# Patient Record
Sex: FEMALE | Race: BLACK OR AFRICAN AMERICAN | NOT HISPANIC OR LATINO | Employment: FULL TIME | URBAN - METROPOLITAN AREA
[De-identification: names, ages, dates, MRNs, and addresses within clinical notes are randomized per-mention and may not be internally consistent; named-entity substitution may affect disease eponyms.]

---

## 2018-02-28 ENCOUNTER — TELEPHONE (OUTPATIENT)
Dept: TRANSPLANT | Facility: CLINIC | Age: 53
End: 2018-02-28

## 2018-02-28 NOTE — TELEPHONE ENCOUNTER
Bridgett Chua called and stated that she is interested in becoming a living donor for her brother in law, Ora Leavitt.  Medical and social history obtained.  No contraindications noted.  All questions answered.  HLA kit requested.  
No

## 2018-03-22 ENCOUNTER — TELEPHONE (OUTPATIENT)
Dept: TRANSPLANT | Facility: CLINIC | Age: 53
End: 2018-03-22

## 2018-03-22 DIAGNOSIS — Z00.5 TRANSPLANT DONOR EVALUATION: Primary | ICD-10-CM

## 2018-03-27 ENCOUNTER — LAB VISIT (OUTPATIENT)
Dept: LAB | Facility: HOSPITAL | Age: 53
End: 2018-03-27
Payer: COMMERCIAL

## 2018-03-27 DIAGNOSIS — Z00.5 TRANSPLANT DONOR EVALUATION: ICD-10-CM

## 2018-03-27 LAB
ABO GROUP BLD: NORMAL
RH BLD: NORMAL

## 2018-03-27 PROCEDURE — 81373 HLA I TYPING 1 LOCUS LR: CPT | Mod: 91,PO,TXP

## 2018-03-27 PROCEDURE — 81373 HLA I TYPING 1 LOCUS LR: CPT | Mod: PO,TXP

## 2018-03-27 PROCEDURE — 36415 COLL VENOUS BLD VENIPUNCTURE: CPT | Mod: TXP

## 2018-03-27 PROCEDURE — 81376 HLA II TYPING 1 LOCUS LR: CPT | Mod: PO,TXP

## 2018-03-27 PROCEDURE — 86901 BLOOD TYPING SEROLOGIC RH(D): CPT | Mod: TXP

## 2018-03-27 PROCEDURE — 81376 HLA II TYPING 1 LOCUS LR: CPT | Mod: 91,PO,TXP

## 2018-03-31 LAB — HLATY INTERPRETATION: NORMAL

## 2018-04-10 LAB
HLA DRB4 1: NORMAL
HLA SSO DNA TYPING CLASS I & II INTERPRETATION: NORMAL
HLA-A 1 SERO. EQUIV: 2
HLA-A 1: NORMAL
HLA-A 2 SERO. EQUIV: 66
HLA-A 2: NORMAL
HLA-B 1 SERO. EQUIV: 62
HLA-B 1: NORMAL
HLA-B 2 SERO. EQUIV: 71
HLA-B 2: NORMAL
HLA-BW 1 SERO. EQUIV: 6
HLA-BW 2 SERO. EQUIV: NORMAL
HLA-C 1: NORMAL
HLA-C 2: NORMAL
HLA-CW 1 SERO. EQUIV: 10
HLA-CW 2 SERO. EQUIV: 9
HLA-DQ 1 SERO. EQUIV: 7
HLA-DQ 2 SERO. EQUIV: 6
HLA-DQB1 1: NORMAL
HLA-DQB1 2: NORMAL
HLA-DRB1 1 SERO. EQUIV: 11
HLA-DRB1 1: NORMAL
HLA-DRB1 2 SERO. EQUIV: 15
HLA-DRB1 2: NORMAL
HLA-DRB3 1: NORMAL
HLA-DRB3 2: NORMAL
HLA-DRB345 1 SERO. EQUIV: 52
HLA-DRB345 2 SERO. EQUIV: 51
HLA-DRB4 2: NORMAL
HLA-DRB5 1: NORMAL
HLA-DRB5 2: NORMAL
SSDQB TESTING DATE: NORMAL
SSDRB TESTING DATE: NORMAL
SSOA TESTING DATE: NORMAL
SSOB TESTING DATE: NORMAL
SSOC TESTING DATE: NORMAL
SSODR TESTING DATE: NORMAL
TYSSO TESTING DATE: NORMAL

## 2018-04-24 ENCOUNTER — TELEPHONE (OUTPATIENT)
Dept: TRANSPLANT | Facility: CLINIC | Age: 53
End: 2018-04-24

## 2018-04-24 DIAGNOSIS — Z00.5 TRANSPLANT DONOR EVALUATION: Primary | ICD-10-CM

## 2018-04-24 NOTE — TELEPHONE ENCOUNTER
Patient notified that the results of the crossmatch with KETTY Leavitt show that they are compatible. Patient states she would like to proceed with testing. Required testing discussed and information provided to schedule appointments.   Patient will have results of mammogram and colonoscopy faxed to us. Will schedule routine gyn exam.   Education material emailed to patient for review.   All questions were answered and patient verbalized understanding.

## 2018-07-16 ENCOUNTER — HOSPITAL ENCOUNTER (OUTPATIENT)
Dept: RADIOLOGY | Facility: HOSPITAL | Age: 53
Discharge: HOME | End: 2018-07-16
Attending: NURSE PRACTITIONER
Payer: COMMERCIAL

## 2018-07-16 ENCOUNTER — HOSPITAL ENCOUNTER (OUTPATIENT)
Dept: CARDIOLOGY | Facility: CLINIC | Age: 53
Discharge: HOME | End: 2018-07-16
Attending: NURSE PRACTITIONER
Payer: COMMERCIAL

## 2018-07-16 ENCOUNTER — LAB VISIT (OUTPATIENT)
Dept: LAB | Facility: HOSPITAL | Age: 53
End: 2018-07-16
Payer: COMMERCIAL

## 2018-07-16 DIAGNOSIS — Z00.5 TRANSPLANT DONOR EVALUATION: ICD-10-CM

## 2018-07-16 LAB
CREAT CL/1.73 SQ M 12H UR+SERPL-ARVRAT: 88 ML/MIN
CREAT SERPL-MCNC: 1 MG/DL
CREAT UR-MCNC: 71 MG/DL
CREATININE, URINE (MG/SPEC): 1260.3 MG/SPEC
DIASTOLIC DYSFUNCTION: NO
PROT 24H UR-MRATE: NORMAL MG/SPEC
PROT UR-MCNC: <7 MG/DL
RETIRED EF AND QEF - SEE NOTES: 65 (ref 55–65)
URINE COLLECTION DURATION: 24 HR
URINE COLLECTION DURATION: 24 HR
URINE VOLUME: 1775 ML
URINE VOLUME: 1775 ML

## 2018-07-16 PROCEDURE — 93351 STRESS TTE COMPLETE: CPT | Mod: S$GLB,TXP,, | Performed by: INTERNAL MEDICINE

## 2018-07-16 PROCEDURE — 84156 ASSAY OF PROTEIN URINE: CPT | Mod: TXP

## 2018-07-16 PROCEDURE — 78707 K FLOW/FUNCT IMAGE W/O DRUG: CPT | Mod: 26,TXP,, | Performed by: RADIOLOGY

## 2018-07-16 PROCEDURE — 82575 CREATININE CLEARANCE TEST: CPT | Mod: TXP

## 2018-07-16 PROCEDURE — A9562 TC99M MERTIATIDE: HCPCS | Mod: TXP

## 2018-07-16 PROCEDURE — 93321 DOPPLER ECHO F-UP/LMTD STD: CPT | Mod: S$GLB,TXP,, | Performed by: INTERNAL MEDICINE

## 2018-07-16 PROCEDURE — 71046 X-RAY EXAM CHEST 2 VIEWS: CPT | Mod: 26,TXP,, | Performed by: RADIOLOGY

## 2018-07-16 PROCEDURE — 71046 X-RAY EXAM CHEST 2 VIEWS: CPT | Mod: TC,FY,TXP

## 2018-07-17 ENCOUNTER — OFFICE VISIT (OUTPATIENT)
Dept: TRANSPLANT | Facility: CLINIC | Age: 53
End: 2018-07-17
Payer: COMMERCIAL

## 2018-07-17 ENCOUNTER — HOSPITAL ENCOUNTER (OUTPATIENT)
Dept: RADIOLOGY | Facility: HOSPITAL | Age: 53
Discharge: HOME | End: 2018-07-17
Attending: TRANSPLANT SURGERY
Payer: COMMERCIAL

## 2018-07-17 VITALS
OXYGEN SATURATION: 98 % | HEIGHT: 61 IN | WEIGHT: 166 LBS | DIASTOLIC BLOOD PRESSURE: 86 MMHG | BODY MASS INDEX: 31.34 KG/M2 | RESPIRATION RATE: 18 BRPM | HEART RATE: 87 BPM | SYSTOLIC BLOOD PRESSURE: 154 MMHG | TEMPERATURE: 99 F

## 2018-07-17 DIAGNOSIS — Z00.5 WILLING TO BE KIDNEY DONOR: Primary | ICD-10-CM

## 2018-07-17 DIAGNOSIS — E78.5 HYPERLIPIDEMIA, UNSPECIFIED HYPERLIPIDEMIA TYPE: Chronic | ICD-10-CM

## 2018-07-17 DIAGNOSIS — F90.9 ATTENTION DEFICIT HYPERACTIVITY DISORDER (ADHD), UNSPECIFIED ADHD TYPE: ICD-10-CM

## 2018-07-17 DIAGNOSIS — Z00.5 TRANSPLANT DONOR EVALUATION: ICD-10-CM

## 2018-07-17 PROCEDURE — 99999 PR PBB SHADOW E&M-EST. PATIENT-LVL IV: CPT | Mod: PBBFAC,TXP,, | Performed by: INTERNAL MEDICINE

## 2018-07-17 PROCEDURE — 97802 MEDICAL NUTRITION INDIV IN: CPT | Mod: S$GLB,TXP,, | Performed by: DIETITIAN, REGISTERED

## 2018-07-17 PROCEDURE — 3008F BODY MASS INDEX DOCD: CPT | Mod: CPTII,S$GLB,TXP, | Performed by: INTERNAL MEDICINE

## 2018-07-17 PROCEDURE — 25500020 PHARM REV CODE 255: Mod: TXP | Performed by: TRANSPLANT SURGERY

## 2018-07-17 PROCEDURE — 74174 CTA ABD&PLVS W/CONTRAST: CPT | Mod: 26,TXP,, | Performed by: RADIOLOGY

## 2018-07-17 PROCEDURE — 74174 CTA ABD&PLVS W/CONTRAST: CPT | Mod: TC,TXP

## 2018-07-17 PROCEDURE — 99205 OFFICE O/P NEW HI 60 MIN: CPT | Mod: S$GLB,TXP,, | Performed by: INTERNAL MEDICINE

## 2018-07-17 PROCEDURE — 99204 OFFICE O/P NEW MOD 45 MIN: CPT | Mod: S$GLB,TXP,, | Performed by: TRANSPLANT SURGERY

## 2018-07-17 RX ORDER — SIMVASTATIN 40 MG/1
40 TABLET, FILM COATED ORAL NIGHTLY
COMMUNITY

## 2018-07-17 RX ORDER — DEXTROAMPHETAMINE SACCHARATE, AMPHETAMINE ASPARTATE, DEXTROAMPHETAMINE SULFATE AND AMPHETAMINE SULFATE 5; 5; 5; 5 MG/1; MG/1; MG/1; MG/1
1 TABLET ORAL DAILY
COMMUNITY

## 2018-07-17 RX ADMIN — IOHEXOL 125 ML: 350 INJECTION, SOLUTION INTRAVENOUS at 01:07

## 2018-07-17 NOTE — LETTER
July 17, 2018                     [facility]  [address]  [facility] [address]  Phone: [phone]   Patient: Bridgett Chua   MR Number: [id number]   YOB: 1965   Date of Visit: 7/17/2018       Dear      Thank you for referring Bridgett Chua to me for evaluation. Attached you will find relevant portions of my assessment and plan of care.    If you have questions, please do not hesitate to call me. I look forward to following Bridgett Chua along with you.    Sincerely,    Vivian Olvera MD    Enclosure    If you would like to receive this communication electronically, please contact [email or web address] or [phone] to request [employer] access.    [employer] is a tool which provides read-only access to select patient information with whom you have a relationship. Its easy to use and provides real time access to review your patients record including encounter summaries, notes, results, and demographic information.    If you feel you have received this communication in error or would no longer like to receive these types of communications, please e-mail [email or web address]

## 2018-07-17 NOTE — PROGRESS NOTES
Kidney Transplant Donor Evaluation    Subjective:       CC:  Initial evaluation of kidney donor candidacy.    HPI:  Ms. Chua is a 52 y.o. year old Unknown female who has presented to be evaluated as a potential living unrelated donor for her brother-in-law.  Ms. Chua reports being here without coercion, payment, guilt or other alternative motives other than wanting to help someone with kidney disease.    States she uses adderall to keep her focused while she works at night - states her PCP prescribed it ~5 months ago.     HLD - started zocor <1 month ago.     Patient denies any history of coronary artery disease, stroke, seizure disorder, chronic obstructive pulmonary disease, liver disease, kidney stones, gallstones, deep venous thrombosis, pulmonary embolism, recurrent urinary tract infections or malignancies.    She is accompanied to visit by her cousin from her dad's side.     Past Medical History:   Past Medical History:   Diagnosis Date    ADD (attention deficit disorder)     Hyperlipidemia     Willing to be kidney donor        Past Surgical History:   Past Surgical History:   Procedure Laterality Date    DILATION AND CURETTAGE OF UTERUS  2007       Medications:   Current Outpatient Prescriptions   Medication Sig Dispense Refill    dextroamphetamine-amphetamine (ADDERALL) 20 mg tablet Take 1 tablet by mouth once daily.      simvastatin (ZOCOR) 40 MG tablet Take 40 mg by mouth every evening.       No current facility-administered medications for this visit.        Allergies:   Review of patient's allergies indicates:  No Known Allergies    Social History:  Social History     Social History    Marital status: Single     Spouse name: N/A    Number of children: 0    Years of education: N/A     Occupational History    Not on file.     Social History Main Topics    Smoking status: Never Smoker    Smokeless tobacco: Never Used    Alcohol use No      Comment: rarely will drink a glass of wine     Drug use: No    Sexual activity: Not on file     Other Topics Concern    Not on file     Social History Narrative    She works as a  for [employer]    Lives with sister and brother-in-law (the potential kidney recipient)    No pets       Family History:   Family History   Problem Relation Age of Onset    Diabetes Mother         diagnosed in her 50-60s    Hypertension Mother         diagnosed in her 50-60s    Breast cancer Mother         diagnosed in her 50s    COPD Mother     Diabetes Father         diagnosed in his 60s    Hypertension Father         diagnosed in his 60s    Prostate cancer Father         diagnosed in his late 40s    Heart attack Father         MI in his mid-late 60s    Lupus Sister         diagnosed in her early 40s    No Known Problems Brother     Breast cancer Maternal Grandmother     No Known Problems Sister     No Known Problems Brother     No Known Problems Brother     Cervical cancer Paternal Grandmother         diagnosed in her 80s    Colon cancer Maternal Aunt     Prostate cancer Paternal Uncle     Heart disease Paternal Uncle     Prostate cancer Paternal Uncle     Prostate cancer Paternal Uncle     Kidney disease Neg Hx     Stroke Neg Hx      OB History      Para Term  AB Living    1 0            SAB TAB Ectopic Multiple Live Births                     Obstetric Comments    Miscarriage at ~3 months requiring D&C          Review of Systems  Constitutional: no fevers, chills, fatigue, loss of appetite, weight gain or loss, night sweats  Eyes: No dryness, redness, blurry vision, loss of vision  Ear, Nose, Throat: No hearing problems, runny nose, mouth dryness, problems swallowing, dental problems  Respiratory: No cough, shortness of breath, sputum, bloody sputum  Cardiovascular: No chest pain or palpitations  Gastrointestinal: no nausea, vomiting, diarrhea, constipation, abdominal pain, blood in stool  Genitourinary: +menstrual cycles occurring once  "every 3 months now - last one was ~1 month ago; No urinary frequency, urgency, incontinence, burning, pain, or bleeding; no flank pain  Skin: no rash or itching  Musculoskeletal: no arthritis or muscle pain  Neurologic: no motor weakness, numbness, tingling, or seizures  Psychiatric: no depression, mood swings, kavya, or anxiety  Hematologic: no easy bleeding or bruising, anemia, or blood clots  Endocrine: no thyroid problems, hot or cold intolerance, or diabetes  Immunologic: no chronic infection, hay fever, eczema    Functional History  Bridgett Chua is able to climb a flight of stairs, walk a mile, jog, and play recreational sports without any limitation or difficulty.    Health Care Maintenance  Routine follow up with PCP: Britney Jo - NP    For Women:  Last Physical Exam: June 2018  Last Colonoscopy: June 2018  Last Pap smear: April/May 2018  Last Mammogram: April 2018  Last Menstrual period: ~1 month  G 1 P 0    Are you pregnant or plan on becoming pregnant within the next year? no  How many previous pregnancies have you had? 1 - miscarriage at 3 months  Have you ever had a miscarriage? yes  Have you had any complications during the pregnancy? miscarriage  Any history of diabetes, hypertension, preeclampsia, or protein in the urine while pregnant? no    Objective:   Physical Exam   BP (!) 150/91 (BP Location: Right arm, Patient Position: Sitting, BP Method: Medium (Automatic))   Pulse 101   Temp 97.4 °F (36.3 °C) (Oral)   Resp 18   Ht 5' 1" (1.549 m)   Wt 75.3 kg (166 lb 0.1 oz)   SpO2 98%   BMI 31.37 kg/m²     General: No acute distress, well groomed, alert and oriented x 3  HEENT: Normocephalic, atraumatic, PERRLA, sclera anicteric, conjunctiva/corneas clear, EOM's intact bilaterally, external inspection of ears and nose normal, moist mucous membranes, no oral ulcerations/lesions   Neck: Supple, symmetrical, trachea midline, no masses, no carotid bruits, no JVD, thyroid is normal without nodules " or enlargement   Respiratory: Clear to auscultation bilaterally, respirations unlabored, no rales/rhonchi/wheezing   Cardiovacular: Regular rate and rhythm, S1, S2 normal, no murmurs, no rubs or gallops   Gastrointestinal: Soft, non-tender, bowel sounds normal, no masses, no palpable organomegaly  Extremities: No clubbing or cyanosis of upper extremities bilaterally, no pedal edema bilaterally; +2 bilateral femoral, posterior tibial, and dorsalis pedis pulses  Skin: warm and dry; no rash on exposed skin  Lymph nodes: Cervical and supraclavicular nodes normal   Neurologic: No focal neurologic deficits.   Musculoskeletal: moves all extremities without difficulty, FROM, 5/5 strength, ambulates without an assistive device  Psychiatric: Normal mood and affect. Responds appropriately to questions.      Labs:  7/16/2018: Creatinine 1.0 mg/dL (Ref range: 0.5 - 1.4 mg/dL); Creatinine, Random Ur 92.0 mg/dL (Ref range: 15.0 - 325.0 mg/dL); Urine, Creatinine 71.0 mg/dL (Ref range: 15.0 - 325.0 mg/dL); BUN, Bld 7 mg/dL (Ref range: 6 - 20 mg/dL)  7/16/2018: Nitrite, UA Negative (Ref range: Negative)  ABO type: O POS    Assessment:     1. Willing to be kidney donor    2. Hyperlipidemia, unspecified hyperlipidemia type    3. Attention deficit hyperactivity disorder (ADHD), unspecified ADHD type        Plan:   Donor Candidacy:   Based on the given information, Ms. Chua appears to be a suitable candidate for kidney donation pending 24 hour BP monitor.  A final recommendation will be made by the selection committee after reviewing her complete workup.    Counseled her regarding avoiding NSAIDs, iodinated contrast dye, excess protein. Encouraged her to continue following up with her PCP    Vivian Olvera MD       Counseling:   I discussed with Ms. Chua that donation is voluntary and reiterated it should be done willingly and for altruistic reasons only.  I reviewed that no payment should be received for donating.  I also discussed  that coercion, guilt, pressure, or feelings of obligation are not appropriate reasons to donate.  The option to withdraw at any time was emphasized.  Ms. Chua was reminded that a medical opt out can be given to protect her confidentiality, and no member of the transplant team will discuss specifics of her health or medical/social history with anyone else without permission.  The need for lifelong routine medical follow-up for optimal health, including routine health maintenance was reviewed.    We also discussed the long term risks associated with kidney donation.  I told the patient that her GFR should return to within 75-80% of pre-donation level within six months of donation.  I informed the patient that there is a small risk of developing albuminuria and hypertension following donor nephrectomy.  I also informed the patient that based on current literature, the risk of developing end-stage renal disease following donor nephrectomy is similar to the general population.    I reviewed with Ms. Chua available lab results and other diagnostics from the evaluation process    Additional Counseling:   The patient was counseled on the need for regular follow-up with a primary care physician for blood pressure and cholesterol screening.  The importance of age appropriate health screening was also emphasized.    Follow-up:   As per protocol    Altogether, 35 minutes of this encounter were spent on counseling, which was greater than 50% of the total visit time (55).

## 2018-07-17 NOTE — PATIENT INSTRUCTIONS
1. Avoid excess use of ibuprofen, naproxen, Motrin (NSAIDs), protein consumption, iodinated contrast dye  2. Stay active   3. Follow-up with your PCP  4. We will have you do a 24 hour blood pressure monitor

## 2018-07-17 NOTE — PROGRESS NOTES
"REFERRING PROVIDER: Vivian Olvera MD    REASON FOR VIST: kidney donor work-up-elevated cholesterol level, wt loss education    PAST MEDICAL HX:   Past Medical History:   Diagnosis Date    ADD (attention deficit disorder)     Hyperlipidemia     Willing to be kidney donor        LABS: 7/16 total cholesterol 202    Ht: 61"    Wt: 166lbs    BMI: 31.37    NUTRITION HX/INTERVENTION: Pt reports hx of elevated cholesterol levels, states she was told it was hereditary by PCP, now taking zocor.  Reports wt stable.  No exercise regimen but walks while working overnight.  Pt reports she is a picky eater, does not consume many fruits or vegetables.  Limits intake of fast food and fried foods.  Dining out: 1-2 times per month.  Pt reports she does not cook typically consumes sandwiches or meals prepared by her sister or parents.  Diet Recall: B- orange juice, L-typically sleeps through lunch, Afternoon meal: peanut butter and jelly sandwich OR meatsauce and spaghetti OR grilled chicken, baked beans, and rice, Dinner- meat either baked or prepared in gravy, rice,  Evening snack while working overnight- fruit juice or a few potato chips.  -Low Cholesterol packet reviewed (types of fats, low cholesterol nutrition guidelines,  Foods recommended/foods to avoid, physical activity, sample menu).  -Encouraged wt loss-discussed increasing physical activity to approx 30 min of exercise/day, encouraged pt to decrease intake of fruit juice and recommend to consume more fresh fruits and vegetables.  -Goal Weight: 158lbs for BMI<30      Patient voiced understanding of education & goals. Contact information was provided & will f/u as needed at clinic visits.     Consultation Time: 15 minutes      "

## 2018-07-17 NOTE — PROGRESS NOTES
Transplant Surgery Kidney Donor Evaluation     Referring Physician:     Subjective:     Chief Complaint: Bridgett Chua is a 52 y.o. year old female who presents today wishing to be evaluated as a potential living unrelated donor for her brother in law. .    History of Present Illness:  Bridgett reports being here without coercion, payment, guilt, or other alternative motives other than wanting to help someone with kidney disease.    Review of Systems   Constitutional: Negative for activity change, appetite change, chills and fever.   HENT: Negative for congestion, nosebleeds, sinus pressure, sore throat and voice change.    Eyes: Negative for pain and visual disturbance.   Respiratory: Negative for cough and shortness of breath.    Cardiovascular: Negative for palpitations and leg swelling.   Gastrointestinal: Negative for abdominal distention, abdominal pain, diarrhea, nausea and vomiting.   Endocrine: Negative for cold intolerance and heat intolerance.   Genitourinary: Negative for dysuria, flank pain, frequency and hematuria.   Musculoskeletal: Negative for back pain and gait problem.   Skin: Negative for color change, pallor and wound.   Allergic/Immunologic: Negative for food allergies.   Neurological: Negative for dizziness, seizures, numbness and headaches.   Hematological: Negative for adenopathy.   Psychiatric/Behavioral: Negative for agitation, behavioral problems, hallucinations and sleep disturbance.       Objective:   Physical Exam   Constitutional: She is oriented to person, place, and time. She appears well-developed and well-nourished.   HENT:   Head: Normocephalic and atraumatic.   Eyes: EOM are normal. Pupils are equal, round, and reactive to light.   Cardiovascular: Normal rate and regular rhythm.    Pulmonary/Chest: Effort normal.   Abdominal: Soft. She exhibits no distension. There is no tenderness. There is no guarding.   Musculoskeletal: Normal range of motion.   Neurological: She is alert and  oriented to person, place, and time.   Skin: Skin is warm and dry.   Psychiatric: She has a normal mood and affect. Her behavior is normal.     ABO type: O POS    Diagnoses:  No diagnosis found.         Transplant Surgery - Candidacy   Assessment/Plan:     Donor Candidacy: Based on information available thus far, Bridgett is a suitable candidate for living kidney donation.    Jeferson Walls Jr, MD       Education: I discussed with the patient the requirements for donation including the compatibility of blood and tissue typing, healthy by physical examination and workup, as well as the desire to donate.  We discussed the risks related to surgery including the risks related to anesthesia, bleeding, infection, inability for surgery to be performed laparoscopically, risks of reoperation as well as the risks of death.  We discussed the length of hospitalization, return to work times, as well as follow-up post-donation.    I also discussed the slight possibility that due to problems with the recipient operation, the transplant might not be able to be completed after the organ was already removed. If such a situation should arise, the donor prefers that the organ be transplanted into a suitable third-party recipient.    I discussed the possibility that living donor sometimes encounter problems obtaining health insurance, or could have higher premium despite ongoing efforts of transplant professionals to educate insurance companies on this issue.    I discussed with Bridgett that donation is a voluntary activity, and reiterated it should be done willingly and for altruistic reasons only.  I reviewed that no payment should be made for donating.  I also discussed that coersion, guilt, pressure, or feelings of obligation are not appropriate reasons to donate.  The option to withdraw at any time was emphasized.  Bridgett was reminded that a medical opt out can be given to protect her confidentiality, and no member of the transplant  team will discuss specifics of her health or medical/social history with anyone else without permission.  The need for lifelong routine medical follow-up for optimal health, including routine health maintenance was reviewed.    Additionally, I discussed the need for our program to be able to contact living donors for UNOS reporting purposes for a minimum of 2 years.  Failure of our center to be able to provide such information could jeopardize our ability to continue to offer living donor transplants.  Bridgett voices understanding and agrees to this follow-up.    I discussed the UNOS requirement for centers to report donor status for a minimum of two years. Bridgett understands that failure to comply with requirement could have adverse consequences for our transplant program, and agrees to cooperate with all our required follow-up.    I reviewed with Bridgett available lab results and other diagnostics from the evaluation process.

## 2018-07-17 NOTE — PROGRESS NOTES
"TRANSPLANT DONOR PSYCHOSOCIAL ASSESSMENT    Bridgett Chua  [address] [facility] [address]  [place] [address]    Telephone Information:   Mobile [phone]     Home [phone] (home)  Work  There is no work phone number on file.  E-mail  [email or web address]    PHS Increased Risk Behavioral Questionnaire reviewed by : Yes   addressed any PHS increased risk behaviors or concerns. Resources and education provided as appropriate.    Sex: female  YOB: 1965  Age: 52 y.o.    Encounter Date: 7/17/2018  [place] Citizen: yes  Primary Language: English   Needed: no    Potential Recipient: Venu Leavitt  Clinic Number: [id number]  Donor's Relationship to Patient: brother in law    Emergency Contact:  Name: Venita Veag  Relationship: cousin  Address: [address] [place] [id number]  Phone Numbers:  n/a (home), n/a (work), [phone] (mobile)    Family/Social Support:   Number of dependents/: none  Marital history: never , no children  Other family dynamics: parents are living and retired, two sisters, three brothers    Household Composition:  Name: Ronen Leavitt ([employer]) [phone] and their dtr, pt's niece (18 yo)  Age: unk and 50  Relationship: sister and brother in law (intended recipient)  Does person drive? yes    Name: Bridgett Chua  Age: 52  Relationship: patient  Does person drive? yes    Do you and your caregivers have access to reliable transportation? yes  PRIMARY CAREGIVER: Scott Chua will be primary caregiver, phone number [phone] (Nabil's cell) [phone] (home). Patient will stay with parents in their home with assistance from sister in law  Val Chua ([facility]) [phone] and other family members.  EUSEBIA spoke with Val by phone to discuss caregiving.  She verbalized understanding and agreement with plan.  Val's 12 y/o dtr received a cadaver kidney at age [age].  She stated, "I'm well versed in " "this."     provided in-depth information to patient and caregiver regarding pre- and post-transplant caregiver role.   strongly encourages patient and caregiver to have concrete plan regarding post-transplant care giving, including back-up caregiver(s) to ensure care giving needs are met as needed.    Patient and Caregiver states understanding all aspects of caregiver role/commitment and is able/willing/committed to being caregiver to the fullest extent necessary.    Patient and Caregiver verbalizes understanding of the education provided today and caregiver responsibilities.         remains available. Patient and Caregiver agree to contact  in a timely manner if concerns arise.      Able to take time off work without financial concerns: yes. Pt stated she has plenty of PTO and sick leave with her job.     Additional Significant Others who will Assist with Transplant:    Pt states multiple family members who are willing and able to assist live in a tight radius around parent's home. CousinPayal will be available to assist when pt returns to [place].     Living Will: no  Healthcare Power of : no  Advance Directives on file: <no information> per medical record.  Verbally reviewed LW/HCPA information.   provided patient with copy of LW/HCPA documents and provided education on completion of forms.    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  Fundraising and NLDAC information provided to patient.  Patient and Caregiver verbalizes understanding.   remains available.    Spouse/Significant Other Employment: n/a    Insurance: see potential recipient's insurance for donor coverage.    Does the donor have health insurance? Yes    Patient and " "Caregiver verbalizes clear understanding that patient may experience difficulty obtaining and/or be denied insurance coverages post-surgery.  This includes and is not limited to disability insurance, life insurance, health insurance, burial insurance, long term care insurance, and other insurances.  Patient also reports understanding that future health concerns related to or unrelated to transplantation may not be covered by patient's insurance.  Resources and information provided and reviewed.      Patient and Caregiver provides verbal permission to release any necessary information to outside resources for patient care and discharge planning.  Resources and information provided and reviewed.      Infusion Service: patient utilizing? no  Home Health: patient utilizing? no  DME: no  ADLS:  Pt states ability to independently accomplish all adls.    Adherence:   Pt states current and expected compliance with all healthcare recommendations..   Adherence education and counseling provided    Per History Section:  Past Medical History:   Diagnosis Date    ADD (attention deficit disorder)     Hyperlipidemia     Willing to be kidney donor      Social History   Substance Use Topics    Smoking status: Never Smoker    Smokeless tobacco: Never Used    Alcohol use No      Comment: rarely will drink a glass of wine     History   Drug Use No     History   Sexual Activity    Sexual activity: Not on file       Per Today's Psychosocial:  Tobacco: none, patient denies any use.  Alcohol: rare.  Illicit Drugs/Non-prescribed Medications: none, patient denies any use.    Patient and Caregiver states clear understanding of the potential impact of substance use as it relates to donor candidacy and is agreeable to random substance screening.  Substance abstinence/cessation counseling, education and resources provided and reviewed.     Arrests/DWI/Treatment/Rehab: patient denies    Psychiatric History:    Mental Health: "Great".  Pt " "denies any mental health concerns.  Psychiatrist/Counselor: pt denies seeing MH professional  Medications:  denies  Suicide/Homicide Issues: Pt denies current or past suicidal/homicidal ideation.   Safety at home: Pt denies any home safety concerns.    Donation Knowledge/Expectations: Patient and Caregiver states having clear understanding and realistic expectations regarding the potential risks and potential benefits of organ transplantation and organ donation and agrees to discuss with health care team members and support system members, as well as to utilize available resources and express questions and/or concerns in order to further facilitate the patients informed decision-making.  Resources and information provided and reviewed.    Following was discussed in private with pt.  Decision-making Process: Pt stated she has a niece who developed kidney dz at an early age and had a transplant at age [age].  Her niece is now 11 and doing well.  Pt stated the transplant was such a gift for her and changed her life so substantially (she was on dialysis at age 6) that when she found out her brother in law was in renal failure it was an easy decision.  "I prayed about it and I volunteered."   Pt stated she does not know the cause of her ABBIE's kidney failure, but stated he has begun to take better care of himself and "he want to live for my sister and his girls".  She stated she is aware of the possibility that the kidney may not work or that the recipient may lose it after receiving it (either through no fault or fault of his own.)  Pt stated she believes she could cope with this by recognizing "I have done my part."    Patient and Caregiver states understanding that transplant and donation are not a guarantee that the donated organ will function.  Patient and Caregiver states understanding of kidney treatment options available for kidney patients, psychosocial aspects surrounding organ donation and transplantation as " well as recovery.  Patient and Caregiver also states clear understanding that patient may choose to not donate at any time prior to surgery taking place, and that patient confidentiality is protected.  Patient and Caregiver reports expected compliance with health care regime and states understanding of importance of compliance.  Educational information provided.    Patient and Caregiver reports having a clear understanding  that risks and benefits may be involved with organ transplantation and with organ donation and  of the treatment options available to a potential transplant recipient. Patient and Caregiver reports clear understanding that psychosocial risk factors which may affect patient, including but not limited to feelings of depression, generalized anxiety, anxiety regarding dependence on others, post traumatic stress disorder, feelings of guilt and other emotional and/or mental concerns, and/or exacerbation of existing mental health concerns.     Detailed resources and education provided and discussed. Patient and Caregiver agrees to access appropriate resources in a timely manner as needed and to communicate concerns appropriately.      Feelings or Concerns: Pt denies feelings or concerns.  Even though she has lived with sister and ABBIE for ten years,  Patient denies feelings of coercion, pressure or obligation to donate. Patient states that patient is not receiving any compensation for organ donation. Patient reports motivation to pursue organ donation at this time.     Patient reports having clear and realistic expectations and understanding of the many psychosocial aspects involved with being a living organ donor, including but not limited to costs, compliance, medications, lab work, procedures, appointments, financial planning, preparedness, timely and appropriate communication of concerns, abstinence from non-prescribed drugs or substances, importance of adherence to and follow-through with all health  care team recommendations, participation in health care and treatment planning, utilization of resources and follow-through, mental health counseling as needed and/or recommended, and the patient and caregiver responsibilities.  Patient states having a clear understanding of possible difficulty obtaining or possibility of being denied insurance coverage post-surgery.  This includes and is not limited to disability insurance, life insurance, health insurance, burial insurance, long-term care insurance and other insurances.  Educational and resource information provided and reviewed.  Patient also reports understanding that future health concerns related to or unrelated to organ donation may not be covered by patients insurance.    Coping:  Pt stated she negar by praying, going to R[employer] when she can, traveling, spending time with family and reading.      Interview Behavior: Bridgett presents as alert and oriented x 4, pleasant, good eye contact, well groomed, recall good, concentration/judgement good, average intelligence, calm, communicative, cooperative and asking and answering questions appropriately.  She was accompanied by a cousin who was present with permission for most of the evaluation until asked to give pt privacy with sw.      Suitability for Donation: Bridgett Chua presents as a suitable candidate for donation at this time.    Recommendations/Additional Comments: Provided NLDAC info and encouraged fundraising.  Pt encouraged to call at any time during process for questions or concerns.

## 2018-07-17 NOTE — PROGRESS NOTES
PHARM.D. PRE-TRANSPLANT DONOR NOTE:    This patient's medication therapy was evaluated as part of her pre-transplant evaluation.    The following pharmacologic concerns were noted: Adderal      Current Outpatient Prescriptions   Medication Sig Dispense Refill    dextroamphetamine-amphetamine (ADDERALL) 20 mg tablet Take 1 tablet by mouth once daily.      simvastatin (ZOCOR) 40 MG tablet Take 40 mg by mouth every evening.       No current facility-administered medications for this visit.          Currently she is responsible for preparing / administering this patient's medications on a daily basis.  I am available for consultation and can be contacted, as needed by the other members of the Kidney Transplant team.

## 2018-07-17 NOTE — PROGRESS NOTES
"DONOR TEACHING NOTE    Met with Bridgett Chua today in clinic to review living donor education.        Topics covered included:  · Kidney donation is done voluntarily and of the donor's free will.  Process can stop at any time.   · Better success rates than cadaveric donation, shorter waiting time for the recipient: less than the 3-5 year wait on the list, more time to prepare: tests/surgery can be planned  · Laboratory studies of blood and urine.  Health exams: records of GYN/pap/mammogram (females); evaluation by transplant team and a psychiatrist (if indicated); diagnostic Tests: CXR, EKG, TB skin test, 24-hour urine collection, renal scan, CT of abdomen to assess kidney anatomy, and stress test (if indicated)  · Team will review workup for approval.  Copy of the approved criteria for donation given to patient.  Surgeon will decide which kidney will be donated.   · Benefits of laparoscopic nephrectomy: less pain, shorter hospital stay, a shorter recovery period.  Risks discussed: bleeding, deep vein thrombosis, pulmonary embolus, the need for re-operation.  Your operation may be converted to an "open" procedure if the surgeon feels it is medically necessary.  · To recovery room, transfer to TSU, if space allows or semi-private room.  Estrada catheter/IV, average hospital stay is 1 day.  At discharge the cost of any prescriptions is the donor's responsibility.  · Post-operative visit 4 weeks after surgery or prior to returning to work, unless a complication arises and you need to be seen sooner.  Off of work for about 3 to 6 weeks, no driving for at least the first 3 weeks.  General surgical complications: infection, allergic reaction, and anesthesia.   · Long life considerations of living with one kidney: risk of HTN and kidney failure   · Following up with PCP 6 months after donation then yearly thereafter to monitor kidney function.  This should include weight, labs, urinalysis, and a blood pressure check.  We " are required to report your progress to [facility] at 6 months, 1 year, and 2 years post-donation.     Blood pressure elevated this morning. Patient states she has never had a problem with hypertension. Blood pressure on stress test yesterday was normal. Patient has a blood pressure monitor at home. She will record blood pressure twice a day for the next week and send readings to us for review.   Required weight loss should she have a right nephrectomy reviewed.   Written educational material provided. Informed consent reviewed and signed. All questions were answered and patient verbalized understanding.     Patient is a suitable candidate for living kidney donation.

## 2018-07-27 ENCOUNTER — COMMITTEE REVIEW (OUTPATIENT)
Dept: TRANSPLANT | Facility: CLINIC | Age: 53
End: 2018-07-27

## 2018-07-27 DIAGNOSIS — Z00.5 TRANSPLANT DONOR EVALUATION: Primary | ICD-10-CM

## 2018-07-27 NOTE — COMMITTEE REVIEW
Bridgett Chua was presented at selection committee on 7/27/18. Unable to determine transplant candidacy at this time. Patient will need 24 hour B/P monitoring. Will consult infectious disease for + strongyloides and obtain a SPEP due to elevated protein in blood testing.      CT scan reviewed - will have a left nephrectomy  Lab reviewed by Dr. Greenberg.    Patient notified of committee decision. Will schedule required testing and send blood pressure monitor when it is available. All questions were answered and patient verbalized understanding.     Note written by Haydee Nixon RN    ===============================================================  I was present at the meeting and attest to the decision of the committee

## 2018-08-21 ENCOUNTER — LAB VISIT (OUTPATIENT)
Dept: LAB | Facility: HOSPITAL | Age: 53
End: 2018-08-21
Attending: INTERNAL MEDICINE
Payer: COMMERCIAL

## 2018-08-21 ENCOUNTER — OFFICE VISIT (OUTPATIENT)
Dept: INFECTIOUS DISEASES | Facility: CLINIC | Age: 53
End: 2018-08-21
Payer: COMMERCIAL

## 2018-08-21 VITALS
TEMPERATURE: 99 F | SYSTOLIC BLOOD PRESSURE: 142 MMHG | HEIGHT: 61 IN | WEIGHT: 169.31 LBS | HEART RATE: 118 BPM | DIASTOLIC BLOOD PRESSURE: 83 MMHG | BODY MASS INDEX: 31.97 KG/M2

## 2018-08-21 DIAGNOSIS — Z00.5 TRANSPLANT DONOR EVALUATION: ICD-10-CM

## 2018-08-21 DIAGNOSIS — Z00.5 WILLING TO BE KIDNEY DONOR: ICD-10-CM

## 2018-08-21 DIAGNOSIS — B78.9 STRONGYLOIDIASIS: Primary | ICD-10-CM

## 2018-08-21 PROCEDURE — 86334 IMMUNOFIX E-PHORESIS SERUM: CPT | Mod: 26,,, | Performed by: PATHOLOGY

## 2018-08-21 PROCEDURE — 84165 PROTEIN E-PHORESIS SERUM: CPT | Mod: 26,,, | Performed by: PATHOLOGY

## 2018-08-21 PROCEDURE — 86334 IMMUNOFIX E-PHORESIS SERUM: CPT | Mod: TXP

## 2018-08-21 PROCEDURE — 3008F BODY MASS INDEX DOCD: CPT | Mod: CPTII,S$GLB,, | Performed by: INTERNAL MEDICINE

## 2018-08-21 PROCEDURE — 99999 PR PBB SHADOW E&M-EST. PATIENT-LVL III: CPT | Mod: PBBFAC,TXP,, | Performed by: INTERNAL MEDICINE

## 2018-08-21 PROCEDURE — 36415 COLL VENOUS BLD VENIPUNCTURE: CPT | Mod: NTX

## 2018-08-21 PROCEDURE — 84165 PROTEIN E-PHORESIS SERUM: CPT | Mod: NTX

## 2018-08-21 PROCEDURE — 99203 OFFICE O/P NEW LOW 30 MIN: CPT | Mod: S$GLB,,, | Performed by: INTERNAL MEDICINE

## 2018-08-21 RX ORDER — IVERMECTIN 3 MG/1
15 TABLET ORAL DAILY
Qty: 10 TABLET | Refills: 0 | Status: SHIPPED | OUTPATIENT
Start: 2018-08-21 | End: 2018-08-23

## 2018-08-21 NOTE — PROGRESS NOTES
Subjective:      Patient ID: Bridgett Chua is a 53 y.o. female.    Chief Complaint: positive strongyloides Ab    History of Present Illness  53-year-old female with HLD presents for positive strongyloides Ab.  Patient is a kidney donor candidate for her brother-in-law.  On pre-donor screening, patient found to be Strongyloides Ab positive.  Patient denied having any symptoms, no fevers, chills, sweats, n/v/d, abdominal pain, cough, SOB, CP, hematuria, dysuria.  She has traveled on multiple cruises in the past - she has been to [place] and [facility].  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  Lives in [place], [place].    Review of Systems   Constitution: Negative for chills, decreased appetite, fever, weakness, malaise/fatigue, night sweats, weight gain and weight loss.   HENT: Negative for congestion, ear pain, hearing loss, hoarse voice, sore throat and tinnitus.    Eyes: Negative for blurred vision, redness and visual disturbance.   Cardiovascular: Negative for chest pain, leg swelling and palpitations.   Respiratory: Negative for cough, hemoptysis, shortness of breath and sputum production.    Hematologic/Lymphatic: Negative for adenopathy. Does not bruise/bleed easily.   Skin: Negative for dry skin, itching, rash and suspicious lesions.   Musculoskeletal: Negative for back pain, joint pain, myalgias and neck pain.   Gastrointestinal: Negative for abdominal pain, constipation, diarrhea, heartburn, nausea and vomiting.   Genitourinary: Negative for dysuria, flank pain, frequency, hematuria, hesitancy and urgency.   Neurological: Negative for dizziness, headaches, numbness and paresthesias.   Psychiatric/Behavioral: Negative for depression and memory loss. The patient does not have insomnia and is not nervous/anxious.      Objective:   Physical Exam   Constitutional: She is oriented to person, place, and time. She appears well-developed and well-nourished. No distress.   HENT:   Head: Normocephalic and atraumatic.    Eyes: Conjunctivae and EOM are normal.   Neck: Normal range of motion. Neck supple.   Pulmonary/Chest: Effort normal. No respiratory distress.   Abdominal: Soft. She exhibits no distension.   Musculoskeletal: Normal range of motion. She exhibits no edema.   Neurological: She is alert and oriented to person, place, and time.   Skin: Skin is warm and dry. No rash noted. She is not diaphoretic. No erythema.   Psychiatric: She has a normal mood and affect. Her behavior is normal.   Vitals reviewed.    Significant labs reviewed:  RPR neg  HepB serologiest neg  HIV neg  Strongyloides pos  Quant gold neg    Assessment:   53-year-old female  - Positive strongyloides antibody  - Potential kidney transplant donor    Plan:   - Ivermectin 15 mg qdaily x2 days    Estee Mota MD MPH  Infectious Diseases [facility]

## 2018-08-22 LAB
ALBUMIN SERPL ELPH-MCNC: 4.04 G/DL
ALPHA1 GLOB SERPL ELPH-MCNC: 0.3 G/DL
ALPHA2 GLOB SERPL ELPH-MCNC: 0.75 G/DL
B-GLOBULIN SERPL ELPH-MCNC: 1 G/DL
GAMMA GLOB SERPL ELPH-MCNC: 1.41 G/DL
PROT SERPL-MCNC: 7.5 G/DL

## 2018-08-23 LAB
INTERPRETATION SERPL IFE-IMP: NORMAL
PATHOLOGIST INTERPRETATION IFE: NORMAL
PATHOLOGIST INTERPRETATION SPE: NORMAL

## 2018-09-04 ENCOUNTER — TELEPHONE (OUTPATIENT)
Dept: TRANSPLANT | Facility: CLINIC | Age: 53
End: 2018-09-04

## 2018-09-04 NOTE — TELEPHONE ENCOUNTER
Blood pressure monitor received and results are acceptable. Patient notified. Will be re-presented at selection committee on 9/14/18. Patient agreed.

## 2018-09-14 ENCOUNTER — COMMITTEE REVIEW (OUTPATIENT)
Dept: TRANSPLANT | Facility: CLINIC | Age: 53
End: 2018-09-14

## 2018-09-14 NOTE — COMMITTEE REVIEW
Bridgett Chua was presented at selection committee on 9/14/18 . Patient did meet selection criteria for living kidney donation. No contraindications noted, no additional testing needed.     CT reviewed, will use the left kidney for nephrectomy.      Patient notified of committee decision. Provided with information to schedule surgery. All questions were answered and patient verbalized understanding.       Note written by Haydee Nixon RN    ===============================================================  I was present at the meeting and attest to the decision of the committee

## 2018-10-19 ENCOUNTER — TELEPHONE (OUTPATIENT)
Dept: TRANSPLANT | Facility: CLINIC | Age: 53
End: 2018-10-19

## 2018-10-19 DIAGNOSIS — Z00.5 TRANSPLANT DONOR EVALUATION: Primary | ICD-10-CM

## 2018-10-19 NOTE — TELEPHONE ENCOUNTER
Confirmed surgery for 11/19/18 and pre-op on 11/6/18.  Will have repeat cxm drawn on 11/12/18 and returned via mail.  HLA kit requested for repeat cxm. Denies any blood thinners or hormone use at this time.  Discussed possibility of cancellation due to a busy service.  Denies need for FMLA forms.  Discussed the need to inform team of any changes in medical condition.   All questions answered and patient verbalized understanding.

## 2018-10-22 ENCOUNTER — TELEPHONE (OUTPATIENT)
Dept: PREADMISSION TESTING | Facility: HOSPITAL | Age: 53
End: 2018-10-22

## 2018-11-05 ENCOUNTER — ANESTHESIA EVENT (OUTPATIENT)
Dept: SURGERY | Facility: HOSPITAL | Age: 53
DRG: 661 | End: 2018-11-05
Payer: COMMERCIAL

## 2018-11-06 ENCOUNTER — OFFICE VISIT (OUTPATIENT)
Dept: TRANSPLANT | Facility: CLINIC | Age: 53
End: 2018-11-06
Payer: COMMERCIAL

## 2018-11-06 ENCOUNTER — HOSPITAL ENCOUNTER (OUTPATIENT)
Dept: PREADMISSION TESTING | Facility: HOSPITAL | Age: 53
Discharge: HOME | End: 2018-11-06
Attending: ANESTHESIOLOGY
Payer: COMMERCIAL

## 2018-11-06 ENCOUNTER — CLINICAL SUPPORT (OUTPATIENT)
Dept: TRANSPLANT | Facility: CLINIC | Age: 53
End: 2018-11-06
Payer: COMMERCIAL

## 2018-11-06 ENCOUNTER — SOCIAL WORK (OUTPATIENT)
Dept: TRANSPLANT | Facility: CLINIC | Age: 53
End: 2018-11-06
Payer: COMMERCIAL

## 2018-11-06 VITALS
BODY MASS INDEX: 31.67 KG/M2 | TEMPERATURE: 99 F | HEART RATE: 98 BPM | RESPIRATION RATE: 18 BRPM | SYSTOLIC BLOOD PRESSURE: 127 MMHG | HEART RATE: 98 BPM | WEIGHT: 167.75 LBS | HEIGHT: 61 IN | RESPIRATION RATE: 18 BRPM | SYSTOLIC BLOOD PRESSURE: 127 MMHG | OXYGEN SATURATION: 96 % | OXYGEN SATURATION: 96 % | HEIGHT: 61 IN | DIASTOLIC BLOOD PRESSURE: 86 MMHG | DIASTOLIC BLOOD PRESSURE: 86 MMHG | WEIGHT: 167.75 LBS | TEMPERATURE: 99 F | BODY MASS INDEX: 31.67 KG/M2

## 2018-11-06 VITALS
RESPIRATION RATE: 18 BRPM | OXYGEN SATURATION: 100 % | HEIGHT: 61 IN | DIASTOLIC BLOOD PRESSURE: 88 MMHG | HEART RATE: 88 BPM | WEIGHT: 170 LBS | BODY MASS INDEX: 32.1 KG/M2 | SYSTOLIC BLOOD PRESSURE: 140 MMHG | TEMPERATURE: 98 F

## 2018-11-06 DIAGNOSIS — Z00.5 WILLING TO BE KIDNEY DONOR: Primary | ICD-10-CM

## 2018-11-06 DIAGNOSIS — E78.5 HYPERLIPIDEMIA, UNSPECIFIED HYPERLIPIDEMIA TYPE: Chronic | ICD-10-CM

## 2018-11-06 DIAGNOSIS — Z76.82 ORGAN TRANSPLANT CANDIDATE: Primary | ICD-10-CM

## 2018-11-06 DIAGNOSIS — F90.9 ATTENTION DEFICIT HYPERACTIVITY DISORDER (ADHD), UNSPECIFIED ADHD TYPE: ICD-10-CM

## 2018-11-06 PROCEDURE — 99999 PR PBB SHADOW E&M-EST. PATIENT-LVL III: CPT | Mod: PBBFAC,TXP,,

## 2018-11-06 PROCEDURE — 99499 UNLISTED E&M SERVICE: CPT | Mod: S$GLB,TXP,, | Performed by: SURGERY

## 2018-11-06 PROCEDURE — 99999 PR PBB SHADOW E&M-EST. PATIENT-LVL III: CPT | Mod: PBBFAC,TXP,, | Performed by: NURSE PRACTITIONER

## 2018-11-06 PROCEDURE — 99214 OFFICE O/P EST MOD 30 MIN: CPT | Mod: S$GLB,TXP,, | Performed by: NURSE PRACTITIONER

## 2018-11-06 PROCEDURE — 3008F BODY MASS INDEX DOCD: CPT | Mod: CPTII,S$GLB,TXP, | Performed by: NURSE PRACTITIONER

## 2018-11-06 PROCEDURE — 99999 PR PBB SHADOW E&M-EST. PATIENT-LVL I: CPT | Mod: PBBFAC,TXP,,

## 2018-11-06 RX ORDER — HEPARIN SODIUM 5000 [USP'U]/ML
5000 INJECTION, SOLUTION INTRAVENOUS; SUBCUTANEOUS
Status: CANCELLED | OUTPATIENT
Start: 2018-11-06

## 2018-11-06 NOTE — PROGRESS NOTES
Met with Bridgett Chua in the clinic as part of her pre-transplant clearance appointment.    1) Performed a complete medication reconciliation.    2) Obtained copies of insurance cards, patient understood that the Pharm.D. will order medications, and that medications must be available prior to discharge   3) Discussed medication education that will occur post-transplant     Patient verbalized understanding and had the opportunity to ask questions

## 2018-11-06 NOTE — ANESTHESIA PREPROCEDURE EVALUATION
[facility]  Anesthesia Pre-Operative Evaluation         Patient Name: Bridgett Chua  YOB: 1965  MRN: [id number]    SUBJECTIVE:     Pre-operative evaluation for Procedure(s) (LRB):  ROBOTIC NEPHRECTOMY, DONOR (Left)     11/06/2018    Bridgett Chua is a 53 y.o. female w/ a significant PMHx of HLD, ADD who presents to pre-op clinic for evaluation prior to scheduled procedure above on 11/19.    Patient has been approved to be a living unrelated donor for her brother in law. She has no significant cardiopulmonary history. She has good exercise tolerance, >4 mets. She otherwise is doing well and has no complaints. We discussed the anesthesia plan in detail. All questions answered.    Prev airway: None documented.  Patient had a d&c, states she was not intubated for procedure. Otherwise only colonoscopy. No documented airways.    Patient Active Problem List   Diagnosis    Willing to be kidney donor    Hyperlipidemia    ADD (attention deficit disorder)       Review of patient's allergies indicates:  No Known Allergies    Current Outpatient Medications:    Current Outpatient Medications:     dextroamphetamine-amphetamine (ADDERALL) 20 mg tablet, Take 1 tablet by mouth once daily., Disp: , Rfl:     simvastatin (ZOCOR) 40 MG tablet, Take 40 mg by mouth every evening., Disp: , Rfl:     Past Surgical History:   Procedure Laterality Date    DILATION AND CURETTAGE OF UTERUS  2007       Social History     Socioeconomic History    Marital status: Single     Spouse name: Not on file    Number of children: 0    Years of education: Not on file    Highest education level: Not on file   Social Needs    Financial resource strain: Not on file    Food insecurity - worry: Not on file    Food insecurity - inability: Not on file    Transportation needs - medical: Not on file    Transportation needs - non-medical: Not on file   Occupational History    Not on file   Tobacco Use    Smoking  status: Never Smoker    Smokeless tobacco: Never Used   Substance and Sexual Activity    Alcohol use: No     Comment: rarely will drink a glass of wine    Drug use: No    Sexual activity: Not on file   Other Topics Concern    Not on file   Social History Narrative    She works as a  for youth    Lives with sister and brother-in-law (the potential kidney recipient)    No pets       OBJECTIVE:     Vital Signs Range (Last 24H):  Temp:  [37.1 °C (98.8 °F)]   Pulse:  [98]   Resp:  [18]   BP: (127)/(86)   SpO2:  [96 %]       Significant Labs:  Lab Results   Component Value Date    WBC 8.65 11/06/2018    HGB 13.6 11/06/2018    HCT 41.4 11/06/2018     11/06/2018    CHOL 202 (H) 07/16/2018    TRIG 102 07/16/2018    HDL 53 07/16/2018    ALT 16 11/06/2018    AST 18 11/06/2018     11/06/2018    K 4.1 11/06/2018     11/06/2018    CREATININE 0.8 11/06/2018    BUN 6 11/06/2018    CO2 26 11/06/2018    INR 1.0 11/06/2018    HGBA1C 5.5 07/16/2018       Diagnostic Studies: No relevant studies.    EKG: No recent studies available.    2D ECHO:  No results found for this or any previous visit.    7/16/2018  Exercise stress echo  PRE-TEST DATA   EKG: Resting electrocardiogram reveals normal sinus rhythm at a rate of 82 bpm.     TEST DESCRIPTION   The patient exercised for 6.52 minutes on a High Ramp protocol, corresponding to a functional capacity of 11 estimated METS, achieving a peak heart rate of 151 bpm, which is 94% of the age predicted maximum heart rate. The patient discontinued exercise   secondary to fatigue.     There were no significant electrocardiographic changes throughout the protocol suggesting ischemia.     EKG Conclusions:    1. The EKG portion of this study is negative for ischemia at a moderate workload, and peak heart rate of 151 bpm (94% of predicted).   2. Exercise capacity is average.   3. Blood pressure response to exercise was normal (Presenting BP: 128/77 Peak BP: 199/89).   4.  The following arrhythmias were present: occasional PVCs.   5. There were no symptoms of chest discomfort or significant dyspnea throughout the protocol.   6. The Duke treadmill score was 7 suggesting a low probability for future cardiovascular events.    Echocardiographic Description:      Aorta: The aortic root is normal in size, measuring 2.4 cm at sinotubular junction and 3.0 cm at Sinuses of Valsalva. The proximal ascending aorta is normal in size, measuring 2.8 cm across.     Left Atrium: The left atrial volume index is normal, measuring 26.53 cc/m2.     Left Ventricle: The left ventricle is normal in size, with an end-diastolic diameter of 3.9 cm, and an end-systolic diameter of 2.4 cm. LV wall thickness is normal, with the septum and the posterior wall each measuring 0.8 cm across. Relative wall   thickness was normal at 0.41, and the LV mass index was 56.2 g/m2 consistent with normal left ventricular mass. There are no regional wall motion abnormalities. Left ventricular systolic function appears normal. Visually estimated ejection fraction is   60-65%. The LV Doppler derived stroke volume equals 49.0 ccs.     Diastolic indices: E wave velocity 0.7 m/s, E/A ratio 0.9,  msec., E/e' ratio(avg) 5. Pulmonary vein systolic/diastolic ratio is normal. Mean left atrial pressures are normal. Diastolic function is normal.     Right Atrium: The right atrium is normal in size, measuring 4.7 cm in length and 3.5 cm in width in the apical view.     Right Ventricle: The right ventricle is normal in size measuring 3.4 cm at the base in the apical right ventricle-focused view. Global right ventricular systolic function appears normal. Tricuspid annular plane systolic excursion (TAPSE) is 2.1 cm.   Tissue Doppler-derived tricuspid annular peak systolic velocity (S prime) is 15.1 cm/s.     Aortic Valve:  Aortic valve is normal in structure with normal leaflet mobility.     Mitral Valve:  Mitral valve is normal in  structure with normal leaflet mobility.     Tricuspid Valve:  Tricuspid valve is normal in structure with normal leaflet mobility.     Pulmonary Valve:  Pulmonary valve is normal in structure with normal leaflet mobility.     Intracavitary: There is no evidence of pericardial effusion, intracavity mass, thrombi, or vegetation.     Post Exercise Imaging:    Immediate post exercise images demonstrate left ventricular function augmenting with the ejection fraction becoming 70%. Right ventricular function augments.     No exercise induced wall motion abnormalities were identified.       CONCLUSIONS     1 - Normal left ventricular systolic function (EF 60-65%).     2 - No wall motion abnormalities.     3 - Normal left ventricular diastolic function.     4 - Normal right ventricular systolic function .     No evidence of stress induced myocardial ischemia.     ASSESSMENT/PLAN:         Anesthesia Evaluation    I have reviewed the Patient Summary Reports.    I have reviewed the Nursing Notes.   I have reviewed the Medications.     Review of Systems  Anesthesia Hx:  No problems with previous Anesthesia Denies Hx of Anesthetic complications  History of prior surgery of interest to airway management or planning: (d&c)  Denies Personal Hx of Anesthesia complications.   Social:  Non-Smoker    EENT/Dental:EENT/Dental Normal   Cardiovascular:   Exercise tolerance: good Denies Hypertension.  Denies MI.  Denies CAD.     Denies Angina. hyperlipidemia  Functional Capacity Can you climb two flights of stairs? ==> Yes    Pulmonary:  Pulmonary Normal  Denies Asthma.  Denies Recent URI.  Denies Sleep Apnea.    Renal/:  Renal/ Normal     Hepatic/GI:  Hepatic/GI Normal Denies PUD.  Denies Hiatal Hernia.  Denies GERD. Denies Liver Disease.  Denies Hepatitis.    Neurological:  Neurology Normal  Denies CVA. Denies Seizures.    Endocrine:  Endocrine Normal Denies Diabetes. Denies Hypothyroidism.    Psych:   Psychiatric History           Physical Exam  General:  Well nourished, Obesity    Airway/Jaw/Neck:  Airway Findings: Mouth Opening: Normal Tongue: Normal  General Airway Assessment: Adult  Mallampati: III  Improves to II with phonation.  TM Distance: Normal, at least 6 cm  Jaw/Neck Findings:  Neck ROM: Normal ROM  Neck Findings:     Eyes/Ears/Nose:  EYES/EARS/NOSE FINDINGS: Normal   Dental:  Dental Findings: In tact   Chest/Lungs:  Chest/Lungs Findings: Clear to auscultation, Normal Respiratory Rate     Heart/Vascular:  Heart Findings: Rate: Normal  Rhythm: Regular Rhythm  Sounds: Normal     Abdomen:  Abdomen Findings:  Normal, Soft, Nontender     Musculoskeletal:  Musculoskeletal Findings: Normal    Mental Status:  Mental Status Findings:  Cooperative, Alert and Oriented         Anesthesia Plan  Type of Anesthesia, risks & benefits discussed:  Anesthesia Type:  general  Patient's Preference: Proceed with anesthesia understanding that the risks are very small but could be serious or life threatening.  Intra-op Monitoring Plan: standard ASA monitors  Intra-op Monitoring Plan Comments:   Post Op Pain Control Plan: multimodal analgesia, IV/PO Opioids PRN and per primary service following discharge from PACU  Post Op Pain Control Plan Comments:   Induction:   IV  Beta Blocker:  Patient is not currently on a Beta-Blocker (No further documentation required).       Informed Consent: Patient understands risks and agrees with Anesthesia plan.  Questions answered. Anesthesia consent signed with patient.  ASA Score: 1     Day of Surgery Review of History & Physical: I have interviewed and examined the patient. I have reviewed the patient's H&P dated:        Anesthesia Plan Notes: Mallampati IV / III. Suggest glidescope in room.         Ready For Surgery From Anesthesia Perspective.

## 2018-11-06 NOTE — H&P
Transplant Surgery Kidney Donor Preoperative Evaluation    Subjective:   CC:  Preoperative evaluation before donor nephrectomy.    HPI:  Ms. Chua is a 53 y.o. year old Black or  female who has been approved to be a living unrelated donor for brother in law.  She denies any changes in her health condition since her previous visit.      Past Medical History:   Diagnosis Date    ADD (attention deficit disorder)     Hyperlipidemia     Willing to be kidney donor      Past Surgical History:   Procedure Laterality Date    DILATION AND CURETTAGE OF UTERUS  2007     Review of patient's allergies indicates:  No Known Allergies  Review of Systems   Constitutional: Negative.  Negative for activity change, appetite change, chills, diaphoresis, fatigue, fever and unexpected weight change.   Respiratory: Negative for cough, choking, chest tightness and shortness of breath.    Cardiovascular: Negative for chest pain.   Gastrointestinal: Negative for abdominal distention, constipation, diarrhea, nausea and vomiting.   Musculoskeletal: Negative for arthralgias.   Skin: Negative for color change and rash.   Neurological: Negative for dizziness and headaches.   Psychiatric/Behavioral: Negative for confusion.   All other systems reviewed and are negative.      Objective:   There were no vitals taken for this visit.  Physical Exam   Constitutional: She is oriented to person, place, and time. She appears well-developed and well-nourished. No distress.   HENT:   Mouth/Throat: No oropharyngeal exudate.   Eyes: Conjunctivae are normal. Pupils are equal, round, and reactive to light. No scleral icterus.   Neck: Neck supple. No thyroid mass and no thyromegaly present.   Cardiovascular: Normal rate, normal heart sounds and intact distal pulses.   Pulmonary/Chest: Effort normal and breath sounds normal. No respiratory distress. She has no wheezes. She has no rales.   Abdominal: Soft. She exhibits no distension, no  ascites and no mass. There is no hepatosplenomegaly. There is no tenderness. There is no rebound and no guarding. No hernia.   Musculoskeletal: She exhibits no edema.   Lymphadenopathy:        Head (right side): No submandibular adenopathy present.        Head (left side): No submandibular adenopathy present.     She has no cervical adenopathy.        Right: No inguinal and no supraclavicular adenopathy present.        Left: No inguinal and no supraclavicular adenopathy present.   Neurological: She is alert and oriented to person, place, and time.   Skin: Skin is dry and intact. No rash noted. She is not diaphoretic.   Psychiatric: She has a normal mood and affect.   Nursing note and vitals reviewed.      ABO type: O POS    Imaging Studies:  Nuclear split function renal scan:   Left:  %50   Right: 50%  CT angiogram:   Left renal arteries: 1   Right renal arteries: 1   Other important findings: retroaortic left renal vein     Assessment:     1. Willing to be kidney donor        Plan:   Transplant Surgery Donor Candidacy:   Ms. Chua remains an excellent candidate for kidney donation.    Based on the preoperative evaluation, a left robotic donor nephrectomy is planned.  Flaquito Colon MD       Counseling:   I discussed with Ms. Chua that donation is a voluntary activity and reiterated it should be done willingly and for altruistic reasons only.  I reviewed that no payment should be received for donating.  I also discussed that coercion, guilt, pressure, or feelings of obligation are not appropriate reasons to donate.  The option to withdraw at any time was emphasized.  Ms. Chua was reminded that a medical opt out can be given to protect her confidentiality, and no member of the transplant team will discuss specifics of her health or medical/social history with anyone else without permission.  The need for lifelong routine medical follow-up for optimal health, including routine health maintenance was  reviewed.    I discussed the risks related to surgery including the risks related to anesthesia, bleeding, infection, inability for surgery to be performed laparoscopically, risks of reoperation as well as the risks of death.  We discussed the length of hospitalization, return to work times, as well as follow-up post-donation.    I also discussed the slight possibility that due to problems with the recipient operation, the transplant might not be able to be completed after the organ was already removed. If such a situation should arise, the donor prefers that the organ be transplanted into a suitable third-party recipient.

## 2018-11-06 NOTE — LETTER
November 6, 2018                     [address] [facility]  [address]  [facility] [address]  Phone: [phone]   Patient: Bridgett Chua   MR Number: [id number]   YOB: 1965   Date of Visit: 11/6/2018       Dear      Thank you for referring Bridgett Chua to me for evaluation. Attached you will find relevant portions of my assessment and plan of care.    If you have questions, please do not hesitate to call me. I look forward to following Bridgett Chau along with you.    Sincerely,    Lesley Dan, NP    Enclosure    If you would like to receive this communication electronically, please contact [email or web address] or [phone] to request [employer] access.    [employer] is a tool which provides read-only access to select patient information with whom you have a relationship. Its easy to use and provides real time access to review your patients record including encounter summaries, notes, results, and demographic information.    If you feel you have received this communication in error or would no longer like to receive these types of communications, please e-mail [email or web address]

## 2018-11-06 NOTE — PROGRESS NOTES
DONOR PRE-OP NOTE    Potential Donor Name: Bridgett Chua, [id number]  Encounter Date: 11/6/2018    Sex: female  YOB: 1965  Age: 53 y.o.    Housing/Contact Info:  [address] [place] [address]  [place] [address]  Telephone Information:   Mobile [phone]    Home: [phone] (home)  Work: There is no work phone number on file.  E-mail: [email or web address]    Potential Surgery Date: 11/19/18  Potential Recipient Name: Venu Quinn, Clinic Number: [id number]  Potential Recipient Relationship: brother in law    Patient presents as alert and oriented x 4, pleasant, good eye contact, well groomed, recall good, concentration/judgement good, average intelligence, calm, communicative, cooperative and asking and answering questions appropriately. Patient presents as a 53 y.o. year old female to donor pre-op appointment for scheduled kidney living donor surgery. Patient's cousin accompanies patient.  Patient states that she is independent with ADLs at this time.  Patient states continued motivation to pursue organ donation at this time.    Does patient drive?  Patient is aware will not be able to drive until medically cleared by the transplant team. Patient verbalizes understanding that patient will need assistance for all transportation needs until medically cleared to drive.    Caregivers/Transportation:  Name: Scott Chua  Age: not provided  Phone: [phone] (c); [phone] (h)  Relationship: parents  Does person drive? yes  Does person have own/reliable transportation? yes    Name: Venita Vega  Age: not provided  Phone: [phone]  Relationship: cousin  Does person drive? yes  Does person have own/reliable transportation? yes     Name: Valmarcial Kirkley  Age: not provided  Phone: [phone]  Relationship: cousin  Does person drive? yes  Does person have own/reliable transportation? yes        Dependents/Others who rely on Patient/Caregiver for care: none    Cognitive:  Education:  some college  Reading Level: college  Reports difficulty with: N/A  Denies difficulty with: reading, writing, seeing, hearing, comprehension, learning and memory    Infusion Service: patient utilizing? no  Home Health: patient utilizing? no  DME:  patient utilizing? no    Living Will: no  Healthcare Power of : no  Written LW/HCPA and verbal information presented to patient today.    Insurance: See potential recipient's insurance for donor coverage.    Patient's Insurance: Does potential donor have their own health insurance? Yes  Possible concerns regarding insurance post-donation reviewed. Patient verbalizes clear understanding.    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  Able to take time off work without financial concerns: yes.. Patient does plan to return to work once medically cleared.   Spouse/Significant Other Employment: occupation: n/a, company: n/a, time at employer: n/a.  Patient states does not expect to have any financial problems following transplant surgery.  Patient states has conducted fundraising to assist with donation costs.    Tobacco/Alcohol/Illicit Drug Abuse: Patient reports does not use tobacco products, alcohol, illicit drugs and non-prescription medications and that patient does plan to remain abstinent.     Psychiatric History: patient denies    Coping: Patient states that she is coping well at this time and states does not have any concerns, questions, or needs. Patient states will call as needed and does understand how to access resources including the  as needed, both inpatient and outpatient.    Resources, information and support provided. Psychosocial aspects regarding organ donation and transplantation were discussed. Patient reports having a clear understanding of resources, information, support and psychosocial aspects related to organ donation.    Discharge Plan:  Patient to discharge to parents' home in [facility] under the care of patient's parents, cousin and javon post-organ transplant. Patient states that patient's family members will be present as caregiver in the hospital. Patient's family members will transport patient home. Patient states agreement with not driving and not returning to work until medically cleared to do so.    Patient continues to report having a clear understanding of confidentiality, option to not donate, alternative treatment options, importance of compliance, resources and resource limitations, insurance and insurance insurable limitations, educational information, and the risks involved. Patient understands that the transplant may or may not work, the transplant date/donation date may be cancelled or postponed, and the psychosocial factors involved before, during and after donation.    Patient states having clear understanding and realistic expectations regarding the potential risks and potential benefits of organ transplantation and organ donation. Patient agrees to further discuss with health care team members and support system members, as well as to utilize available resources and express questions and/or concerns. Resources and information provided and reviewed.    Patient denies feelings of coercion, pressure or obligation to donate. Patient states that she is not receiving compensation for organ donation.    Patient reports motivation to pursue organ donation at this time.    Suitability for Donation: At this time, patient presents as a a suitable candidate for organ donation. Patient states does have a caregiver plan, transportation plan, and lodging plan in place. Patient states that patient does have medical and prescription medicine insurance in place and does have a plan in place to afford post-transplant costs.    Patient provided verbal permission to release any necessary information to outside resources for patient care and discharge  planning.  Resources and information provided and reviewed.  Patient is choosing local pharmacy.    Pharmacy Name: [facility]     provided psychosocial support, counseling, resources, education, assistance, and discharge planning.  remains available.    Recommendations/Additional Comments: Recommend that pt contact transplant team for any concerns at any time prior to transplant.     Patient states is aware of [facility]'s affiliation and/or partnership with agencies in home health care, LTAC, SNF, [facility], and other hospitals and clinics.

## 2018-11-08 NOTE — PROGRESS NOTES
PRE-OP TEACHING NOTE    Bridgett Chau is here today for pre-op appointments.  Pre-op instructions reviewed and written information was provided.  All questions were answered.  Discussed possibility that surgery may be rescheduled if the program is busy with  donor transplants.  Patient agreed and verbalized understanding of all instructions.

## 2018-11-13 DIAGNOSIS — Z00.5 TRANSPLANT DONOR EVALUATION: Primary | ICD-10-CM

## 2018-11-19 ENCOUNTER — ANESTHESIA (OUTPATIENT)
Dept: SURGERY | Facility: HOSPITAL | Age: 53
DRG: 661 | End: 2018-11-19
Payer: COMMERCIAL

## 2018-11-19 ENCOUNTER — HOSPITAL ENCOUNTER (INPATIENT)
Facility: HOSPITAL | Age: 53
LOS: 2 days | Discharge: HOME | DRG: 661 | End: 2018-11-21
Attending: TRANSPLANT SURGERY | Admitting: SURGERY
Payer: COMMERCIAL

## 2018-11-19 DIAGNOSIS — K59.09 OTHER CONSTIPATION: ICD-10-CM

## 2018-11-19 DIAGNOSIS — Z00.5 TRANSPLANT DONOR EVALUATION: Primary | ICD-10-CM

## 2018-11-19 DIAGNOSIS — Z90.5 S/P NEPHRECTOMY: ICD-10-CM

## 2018-11-19 DIAGNOSIS — Z00.5 WILLING TO BE KIDNEY DONOR: ICD-10-CM

## 2018-11-19 LAB
B-HCG UR QL: NEGATIVE
CTP QC/QA: YES
HCT VFR BLD AUTO: 35.9 %
HCT VFR BLD AUTO: 39.3 %

## 2018-11-19 PROCEDURE — 25000003 PHARM REV CODE 250: Mod: NTX | Performed by: TRANSPLANT SURGERY

## 2018-11-19 PROCEDURE — 27201423 OPTIME MED/SURG SUP & DEVICES STERILE SUPPLY: Performed by: TRANSPLANT SURGERY

## 2018-11-19 PROCEDURE — S5010 5% DEXTROSE AND 0.45% SALINE: HCPCS | Performed by: TRANSPLANT SURGERY

## 2018-11-19 PROCEDURE — 63600175 PHARM REV CODE 636 W HCPCS: Performed by: ANESTHESIOLOGY

## 2018-11-19 PROCEDURE — 81025 URINE PREGNANCY TEST: CPT | Mod: NTX | Performed by: TRANSPLANT SURGERY

## 2018-11-19 PROCEDURE — 36000713 HC OR TIME LEV V EA ADD 15 MIN: Performed by: TRANSPLANT SURGERY

## 2018-11-19 PROCEDURE — 25000003 PHARM REV CODE 250: Performed by: STUDENT IN AN ORGANIZED HEALTH CARE EDUCATION/TRAINING PROGRAM

## 2018-11-19 PROCEDURE — 63600175 PHARM REV CODE 636 W HCPCS: Mod: NTX | Performed by: SURGERY

## 2018-11-19 PROCEDURE — 25000003 PHARM REV CODE 250

## 2018-11-19 PROCEDURE — 85014 HEMATOCRIT: CPT

## 2018-11-19 PROCEDURE — 25000003 PHARM REV CODE 250: Performed by: TRANSPLANT SURGERY

## 2018-11-19 PROCEDURE — 20600001 HC STEP DOWN PRIVATE ROOM

## 2018-11-19 PROCEDURE — 37000009 HC ANESTHESIA EA ADD 15 MINS: Performed by: TRANSPLANT SURGERY

## 2018-11-19 PROCEDURE — 63600175 PHARM REV CODE 636 W HCPCS: Performed by: SURGERY

## 2018-11-19 PROCEDURE — 63600175 PHARM REV CODE 636 W HCPCS: Performed by: TRANSPLANT SURGERY

## 2018-11-19 PROCEDURE — 25000003 PHARM REV CODE 250: Performed by: NURSE ANESTHETIST, CERTIFIED REGISTERED

## 2018-11-19 PROCEDURE — 50547 LAPARO REMOVAL DONOR KIDNEY: CPT | Mod: 82,LT,, | Performed by: TRANSPLANT SURGERY

## 2018-11-19 PROCEDURE — 27000221 HC OXYGEN, UP TO 24 HOURS

## 2018-11-19 PROCEDURE — 63600175 PHARM REV CODE 636 W HCPCS: Performed by: NURSE ANESTHETIST, CERTIFIED REGISTERED

## 2018-11-19 PROCEDURE — 71000039 HC RECOVERY, EACH ADD'L HOUR: Performed by: TRANSPLANT SURGERY

## 2018-11-19 PROCEDURE — 0TT14ZZ RESECTION OF LEFT KIDNEY, PERCUTANEOUS ENDOSCOPIC APPROACH: ICD-10-PCS | Performed by: TRANSPLANT SURGERY

## 2018-11-19 PROCEDURE — D9220A PRA ANESTHESIA: Mod: ANES,,, | Performed by: ANESTHESIOLOGY

## 2018-11-19 PROCEDURE — 8E0W4CZ ROBOTIC ASSISTED PROCEDURE OF TRUNK REGION, PERCUTANEOUS ENDOSCOPIC APPROACH: ICD-10-PCS | Performed by: TRANSPLANT SURGERY

## 2018-11-19 PROCEDURE — 50547 LAPARO REMOVAL DONOR KIDNEY: CPT | Mod: LT,,, | Performed by: TRANSPLANT SURGERY

## 2018-11-19 PROCEDURE — 36000712 HC OR TIME LEV V 1ST 15 MIN: Performed by: TRANSPLANT SURGERY

## 2018-11-19 PROCEDURE — 36415 COLL VENOUS BLD VENIPUNCTURE: CPT

## 2018-11-19 PROCEDURE — 94761 N-INVAS EAR/PLS OXIMETRY MLT: CPT

## 2018-11-19 PROCEDURE — 71000033 HC RECOVERY, INTIAL HOUR: Performed by: TRANSPLANT SURGERY

## 2018-11-19 PROCEDURE — 37000008 HC ANESTHESIA 1ST 15 MINUTES: Performed by: TRANSPLANT SURGERY

## 2018-11-19 PROCEDURE — 25000003 PHARM REV CODE 250: Performed by: NURSE PRACTITIONER

## 2018-11-19 PROCEDURE — D9220A PRA ANESTHESIA: Mod: CRNA,,, | Performed by: NURSE ANESTHETIST, CERTIFIED REGISTERED

## 2018-11-19 PROCEDURE — S0020 INJECTION, BUPIVICAINE HYDRO: HCPCS | Performed by: TRANSPLANT SURGERY

## 2018-11-19 RX ORDER — BISACODYL 5 MG
10 TABLET, DELAYED RELEASE (ENTERIC COATED) ORAL DAILY
Status: DISCONTINUED | OUTPATIENT
Start: 2018-11-20 | End: 2018-11-21 | Stop reason: HOSPADM

## 2018-11-19 RX ORDER — SODIUM CHLORIDE 9 MG/ML
INJECTION, SOLUTION INTRAVENOUS CONTINUOUS
Status: DISCONTINUED | OUTPATIENT
Start: 2018-11-19 | End: 2018-11-19

## 2018-11-19 RX ORDER — DOCUSATE SODIUM 100 MG/1
100 CAPSULE, LIQUID FILLED ORAL 3 TIMES DAILY
Status: DISCONTINUED | OUTPATIENT
Start: 2018-11-19 | End: 2018-11-21 | Stop reason: HOSPADM

## 2018-11-19 RX ORDER — OXYCODONE AND ACETAMINOPHEN 5; 325 MG/1; MG/1
TABLET ORAL
Status: COMPLETED
Start: 2018-11-19 | End: 2018-11-19

## 2018-11-19 RX ORDER — GLYCOPYRROLATE 0.2 MG/ML
INJECTION INTRAMUSCULAR; INTRAVENOUS
Status: DISCONTINUED | OUTPATIENT
Start: 2018-11-19 | End: 2018-11-19

## 2018-11-19 RX ORDER — MIDAZOLAM HYDROCHLORIDE 1 MG/ML
INJECTION INTRAMUSCULAR; INTRAVENOUS
Status: DISCONTINUED | OUTPATIENT
Start: 2018-11-19 | End: 2018-11-19

## 2018-11-19 RX ORDER — LIDOCAINE HCL/PF 100 MG/5ML
SYRINGE (ML) INTRAVENOUS
Status: DISCONTINUED | OUTPATIENT
Start: 2018-11-19 | End: 2018-11-19

## 2018-11-19 RX ORDER — HEPARIN SODIUM 5000 [USP'U]/ML
5000 INJECTION, SOLUTION INTRAVENOUS; SUBCUTANEOUS EVERY 8 HOURS
Status: DISCONTINUED | OUTPATIENT
Start: 2018-11-19 | End: 2018-11-21 | Stop reason: HOSPADM

## 2018-11-19 RX ORDER — DIPHENHYDRAMINE HYDROCHLORIDE 50 MG/ML
25 INJECTION INTRAMUSCULAR; INTRAVENOUS EVERY 6 HOURS PRN
Status: DISCONTINUED | OUTPATIENT
Start: 2018-11-19 | End: 2018-11-19 | Stop reason: HOSPADM

## 2018-11-19 RX ORDER — KETOROLAC TROMETHAMINE 30 MG/ML
INJECTION, SOLUTION INTRAMUSCULAR; INTRAVENOUS
Status: COMPLETED
Start: 2018-11-19 | End: 2018-11-19

## 2018-11-19 RX ORDER — ONDANSETRON 2 MG/ML
INJECTION INTRAMUSCULAR; INTRAVENOUS
Status: DISCONTINUED | OUTPATIENT
Start: 2018-11-19 | End: 2018-11-19

## 2018-11-19 RX ORDER — HEPARIN SODIUM 5000 [USP'U]/ML
5000 INJECTION, SOLUTION INTRAVENOUS; SUBCUTANEOUS
Status: COMPLETED | OUTPATIENT
Start: 2018-11-19 | End: 2018-11-19

## 2018-11-19 RX ORDER — FUROSEMIDE 10 MG/ML
INJECTION INTRAMUSCULAR; INTRAVENOUS
Status: DISCONTINUED | OUTPATIENT
Start: 2018-11-19 | End: 2018-11-19

## 2018-11-19 RX ORDER — CEFAZOLIN SODIUM 1 G/3ML
2 INJECTION, POWDER, FOR SOLUTION INTRAMUSCULAR; INTRAVENOUS
Status: COMPLETED | OUTPATIENT
Start: 2018-11-19 | End: 2018-11-19

## 2018-11-19 RX ORDER — BUPIVACAINE HYDROCHLORIDE 5 MG/ML
INJECTION, SOLUTION EPIDURAL; INTRACAUDAL
Status: DISCONTINUED | OUTPATIENT
Start: 2018-11-19 | End: 2018-11-19 | Stop reason: HOSPADM

## 2018-11-19 RX ORDER — KETOROLAC TROMETHAMINE 30 MG/ML
INJECTION, SOLUTION INTRAMUSCULAR; INTRAVENOUS
Status: DISCONTINUED | OUTPATIENT
Start: 2018-11-19 | End: 2018-11-19

## 2018-11-19 RX ORDER — CEFAZOLIN SODIUM 1 G/3ML
2 INJECTION, POWDER, FOR SOLUTION INTRAMUSCULAR; INTRAVENOUS
Status: COMPLETED | OUTPATIENT
Start: 2018-11-19 | End: 2018-11-20

## 2018-11-19 RX ORDER — KETOROLAC TROMETHAMINE 30 MG/ML
15 INJECTION, SOLUTION INTRAMUSCULAR; INTRAVENOUS EVERY 6 HOURS
Status: DISCONTINUED | OUTPATIENT
Start: 2018-11-19 | End: 2018-11-20

## 2018-11-19 RX ORDER — FENTANYL CITRATE 50 UG/ML
25 INJECTION, SOLUTION INTRAMUSCULAR; INTRAVENOUS EVERY 5 MIN PRN
Status: DISCONTINUED | OUTPATIENT
Start: 2018-11-19 | End: 2018-11-19 | Stop reason: HOSPADM

## 2018-11-19 RX ORDER — MEPERIDINE HYDROCHLORIDE 50 MG/ML
12.5 INJECTION INTRAMUSCULAR; INTRAVENOUS; SUBCUTANEOUS ONCE AS NEEDED
Status: DISCONTINUED | OUTPATIENT
Start: 2018-11-19 | End: 2018-11-19 | Stop reason: HOSPADM

## 2018-11-19 RX ORDER — OXYCODONE AND ACETAMINOPHEN 5; 325 MG/1; MG/1
2 TABLET ORAL EVERY 4 HOURS PRN
Status: DISCONTINUED | OUTPATIENT
Start: 2018-11-19 | End: 2018-11-21 | Stop reason: HOSPADM

## 2018-11-19 RX ORDER — OXYCODONE AND ACETAMINOPHEN 5; 325 MG/1; MG/1
1 TABLET ORAL EVERY 4 HOURS PRN
Status: DISCONTINUED | OUTPATIENT
Start: 2018-11-19 | End: 2018-11-21 | Stop reason: HOSPADM

## 2018-11-19 RX ORDER — ONDANSETRON 2 MG/ML
4 INJECTION INTRAMUSCULAR; INTRAVENOUS ONCE AS NEEDED
Status: DISCONTINUED | OUTPATIENT
Start: 2018-11-19 | End: 2018-11-19 | Stop reason: HOSPADM

## 2018-11-19 RX ORDER — DEXTROSE MONOHYDRATE AND SODIUM CHLORIDE 5; .45 G/100ML; G/100ML
INJECTION, SOLUTION INTRAVENOUS CONTINUOUS
Status: DISCONTINUED | OUTPATIENT
Start: 2018-11-19 | End: 2018-11-20

## 2018-11-19 RX ORDER — ONDANSETRON 4 MG/1
4 TABLET, ORALLY DISINTEGRATING ORAL EVERY 6 HOURS PRN
Status: DISCONTINUED | OUTPATIENT
Start: 2018-11-19 | End: 2018-11-21 | Stop reason: HOSPADM

## 2018-11-19 RX ORDER — PROPOFOL 10 MG/ML
VIAL (ML) INTRAVENOUS
Status: DISCONTINUED | OUTPATIENT
Start: 2018-11-19 | End: 2018-11-19

## 2018-11-19 RX ORDER — LIDOCAINE HYDROCHLORIDE 10 MG/ML
1 INJECTION, SOLUTION EPIDURAL; INFILTRATION; INTRACAUDAL; PERINEURAL ONCE
Status: COMPLETED | OUTPATIENT
Start: 2018-11-19 | End: 2018-11-19

## 2018-11-19 RX ORDER — NEOSTIGMINE METHYLSULFATE 1 MG/ML
INJECTION, SOLUTION INTRAVENOUS
Status: DISCONTINUED | OUTPATIENT
Start: 2018-11-19 | End: 2018-11-19

## 2018-11-19 RX ORDER — ONDANSETRON 2 MG/ML
4 INJECTION INTRAMUSCULAR; INTRAVENOUS EVERY 6 HOURS PRN
Status: DISCONTINUED | OUTPATIENT
Start: 2018-11-19 | End: 2018-11-21 | Stop reason: HOSPADM

## 2018-11-19 RX ORDER — FENTANYL CITRATE 50 UG/ML
INJECTION, SOLUTION INTRAMUSCULAR; INTRAVENOUS
Status: DISCONTINUED | OUTPATIENT
Start: 2018-11-19 | End: 2018-11-19

## 2018-11-19 RX ORDER — HYDROMORPHONE HYDROCHLORIDE 1 MG/ML
0.2 INJECTION, SOLUTION INTRAMUSCULAR; INTRAVENOUS; SUBCUTANEOUS EVERY 5 MIN PRN
Status: DISCONTINUED | OUTPATIENT
Start: 2018-11-19 | End: 2018-11-19 | Stop reason: HOSPADM

## 2018-11-19 RX ORDER — ROCURONIUM BROMIDE 10 MG/ML
INJECTION, SOLUTION INTRAVENOUS
Status: DISCONTINUED | OUTPATIENT
Start: 2018-11-19 | End: 2018-11-19

## 2018-11-19 RX ADMIN — OXYCODONE HYDROCHLORIDE AND ACETAMINOPHEN 1 TABLET: 5; 325 TABLET ORAL at 11:11

## 2018-11-19 RX ADMIN — FENTANYL CITRATE 100 MCG: 50 INJECTION, SOLUTION INTRAMUSCULAR; INTRAVENOUS at 08:11

## 2018-11-19 RX ADMIN — KETOROLAC TROMETHAMINE 15 MG: 30 INJECTION, SOLUTION INTRAMUSCULAR at 11:11

## 2018-11-19 RX ADMIN — ONDANSETRON 4 MG: 4 TABLET, ORALLY DISINTEGRATING ORAL at 09:11

## 2018-11-19 RX ADMIN — OXYCODONE HYDROCHLORIDE AND ACETAMINOPHEN 2 TABLET: 5; 325 TABLET ORAL at 11:11

## 2018-11-19 RX ADMIN — FENTANYL CITRATE 100 MCG: 50 INJECTION, SOLUTION INTRAMUSCULAR; INTRAVENOUS at 07:11

## 2018-11-19 RX ADMIN — DEXTROSE AND SODIUM CHLORIDE: 5; .45 INJECTION, SOLUTION INTRAVENOUS at 10:11

## 2018-11-19 RX ADMIN — KETOROLAC TROMETHAMINE 15 MG: 30 INJECTION, SOLUTION INTRAMUSCULAR at 06:11

## 2018-11-19 RX ADMIN — KETOROLAC TROMETHAMINE 15 MG: 30 INJECTION, SOLUTION INTRAMUSCULAR; INTRAVENOUS at 10:11

## 2018-11-19 RX ADMIN — ROCURONIUM BROMIDE 50 MG: 10 INJECTION, SOLUTION INTRAVENOUS at 07:11

## 2018-11-19 RX ADMIN — SODIUM CHLORIDE, SODIUM GLUCONATE, SODIUM ACETATE, POTASSIUM CHLORIDE, MAGNESIUM CHLORIDE, SODIUM PHOSPHATE, DIBASIC, AND POTASSIUM PHOSPHATE: .53; .5; .37; .037; .03; .012; .00082 INJECTION, SOLUTION INTRAVENOUS at 08:11

## 2018-11-19 RX ADMIN — SODIUM CHLORIDE 10 ML/HR: 0.9 INJECTION, SOLUTION INTRAVENOUS at 06:11

## 2018-11-19 RX ADMIN — FUROSEMIDE 10 MG: 10 INJECTION, SOLUTION INTRAMUSCULAR; INTRAVENOUS at 09:11

## 2018-11-19 RX ADMIN — GLYCOPYRROLATE 0.5 MG: 0.2 INJECTION, SOLUTION INTRAMUSCULAR; INTRAVENOUS at 10:11

## 2018-11-19 RX ADMIN — NEOSTIGMINE METHYLSULFATE 5 MG: 1 INJECTION INTRAVENOUS at 10:11

## 2018-11-19 RX ADMIN — CEFAZOLIN 2 G: 330 INJECTION, POWDER, FOR SOLUTION INTRAMUSCULAR; INTRAVENOUS at 03:11

## 2018-11-19 RX ADMIN — SODIUM CHLORIDE, SODIUM GLUCONATE, SODIUM ACETATE, POTASSIUM CHLORIDE, MAGNESIUM CHLORIDE, SODIUM PHOSPHATE, DIBASIC, AND POTASSIUM PHOSPHATE: .53; .5; .37; .037; .03; .012; .00082 INJECTION, SOLUTION INTRAVENOUS at 09:11

## 2018-11-19 RX ADMIN — LIDOCAINE HYDROCHLORIDE 1 MG: 10 INJECTION, SOLUTION EPIDURAL; INFILTRATION; INTRACAUDAL; PERINEURAL at 05:11

## 2018-11-19 RX ADMIN — HEPARIN SODIUM 5000 UNITS: 5000 INJECTION, SOLUTION INTRAVENOUS; SUBCUTANEOUS at 06:11

## 2018-11-19 RX ADMIN — HEPARIN SODIUM 5000 UNITS: 5000 INJECTION, SOLUTION INTRAVENOUS; SUBCUTANEOUS at 02:11

## 2018-11-19 RX ADMIN — SODIUM CHLORIDE 500 ML: 0.9 INJECTION, SOLUTION INTRAVENOUS at 11:11

## 2018-11-19 RX ADMIN — ROCURONIUM BROMIDE 20 MG: 10 INJECTION, SOLUTION INTRAVENOUS at 08:11

## 2018-11-19 RX ADMIN — MIDAZOLAM HYDROCHLORIDE 2 MG: 1 INJECTION, SOLUTION INTRAMUSCULAR; INTRAVENOUS at 07:11

## 2018-11-19 RX ADMIN — HEPARIN SODIUM 5000 UNITS: 5000 INJECTION, SOLUTION INTRAVENOUS; SUBCUTANEOUS at 10:11

## 2018-11-19 RX ADMIN — CEFAZOLIN 2 G: 330 INJECTION, POWDER, FOR SOLUTION INTRAMUSCULAR; INTRAVENOUS at 11:11

## 2018-11-19 RX ADMIN — OXYCODONE HYDROCHLORIDE AND ACETAMINOPHEN 1 TABLET: 5; 325 TABLET ORAL at 05:11

## 2018-11-19 RX ADMIN — LIDOCAINE HYDROCHLORIDE 100 MG: 20 INJECTION, SOLUTION INTRAVENOUS at 07:11

## 2018-11-19 RX ADMIN — ONDANSETRON 4 MG: 2 INJECTION INTRAMUSCULAR; INTRAVENOUS at 09:11

## 2018-11-19 RX ADMIN — Medication 0.2 MG: at 11:11

## 2018-11-19 RX ADMIN — ROCURONIUM BROMIDE 10 MG: 10 INJECTION, SOLUTION INTRAVENOUS at 08:11

## 2018-11-19 RX ADMIN — CEFAZOLIN 2 G: 330 INJECTION, POWDER, FOR SOLUTION INTRAMUSCULAR; INTRAVENOUS at 07:11

## 2018-11-19 RX ADMIN — ROCURONIUM BROMIDE 10 MG: 10 INJECTION, SOLUTION INTRAVENOUS at 09:11

## 2018-11-19 RX ADMIN — DOCUSATE SODIUM 100 MG: 100 CAPSULE, LIQUID FILLED ORAL at 10:11

## 2018-11-19 RX ADMIN — PROPOFOL 130 MG: 10 INJECTION, EMULSION INTRAVENOUS at 07:11

## 2018-11-19 NOTE — ANESTHESIA POSTPROCEDURE EVALUATION
"Anesthesia Post Evaluation    Patient: Bridgett Chua    Procedure(s) Performed: Procedure(s) (LRB):  ROBOTIC NEPHRECTOMY, DONOR (Left)    Final Anesthesia Type: general  Patient location during evaluation: PACU  Patient participation: Yes- Able to Participate  Level of consciousness: awake and alert  Post-procedure vital signs: reviewed and stable  Pain management: adequate  Airway patency: patent  PONV status at discharge: No PONV  Anesthetic complications: no      Cardiovascular status: blood pressure returned to baseline  Respiratory status: unassisted  Hydration status: euvolemic  Follow-up not needed.        Visit Vitals  /87 (BP Location: Left arm, Patient Position: Lying)   Pulse 90   Temp 37.1 °C (98.7 °F) (Oral)   Resp 16   Ht 5' 1" (1.549 m)   Wt 75.8 kg (167 lb)   LMP 03/01/2018   SpO2 97%   Breastfeeding? No   BMI 31.55 kg/m²       Pain/Michael Score: Pain Assessment Performed: Yes (11/19/2018  2:05 PM)  Presence of Pain: denies (11/19/2018  2:05 PM)  Pain Rating Prior to Med Admin: 8 (11/19/2018 11:17 AM)  Pain Rating Post Med Admin: 5 (11/19/2018 11:45 AM)        "

## 2018-11-19 NOTE — PROGRESS NOTES
DONOR NEPHRECTOMY NOTE:    Bridgett Chua is s/p donor nephrectomy on 11/19/18. This patients medications prior to surgery were reviewed and restarted, as appropriate.

## 2018-11-19 NOTE — OP NOTE
Operative Report    Date of Procedure: 11/19/2018    Surgeons:  Surgeon(s) and Role:     * Rosario Vaz MD - Primary     * Vamsi Castaneda MD - Assisting    First Assistant Attestation:  The presence of an additional attending surgeon functioning as first assistant was required due to the complexity of the procedure relative to any available residents. I certify that no resident was available who was qualified to serve as first assistant. Duties performed by the assistant included assisting the primary surgeon.    Pre-operative Diagnosis: Living Kidney Donor  Post-operative Diagnosis: Same  Procedure(s) Performed:   Left Kidney  robotic donor nephrectomy    Anesthesia: General endotracheal    Preamble  Indications and Patient Counseling: The patient is a 53 y.o. year-old female who has been evaluated as a living kidney donor.  A left  robotic nephrectomy is planned.  The patient has been thoroughly informed regarding the risks of the procedure, including death, serious surgical complications, bleeding, and reoperation.  She has also been informed of the possible need for conversion to open surgery.  She is aware that kidney donation is completely voluntary, and denies any coercion or motive for donating other than a sincere desire to benefit the recipient.  The patient voices understanding and agrees to proceed with the planned procedure.    ABO Confirmation: Prior to proceeding with donor nephrectomy, a formal ABO confirmation was done according to hospital and UNOS policies.  I confirmed the UNOS ID number of the donor organ and the donor and recipient ABO types.  This confirmation was personally done by an attending surgeon and circulating nurse, and is officially documented elsewhere.    Time-Out: A complete time out was carried out prior to incision, with confirmation of patient identity, correct procedure, correct operative site, appropriate antibiotic prophylaxis, review of any known allergies,  and presence of all needed equipment.    Procedure in Detail  Following the induction of general endotracheal anesthesia, the patient was positioned in a right lateral decubitus position with the table flexed.  The abdomen and left flank were sterilely prepped and draped.  A balloon-tipped 12 mm laparoscopic port was introduced just below the umbilicus using an open Sofia technique.  The abdomen was then insufflated to 15 mmHg pressure.  Three additional ports were then placed consisting of a 12 mm port to the left of the umbilicus, an 8 mm da Cortney port just below the left costal margin near the mid axillary line and another 8 mm da Cortney port just medial to the anterior superior iliac spine.  The da Cortney robotic system was then docked to these ports.  Dissection was then carried out using the da Cortney system.  The colon was mobilized down to the pelvic brim.  Gerota's fascia was then opened and the left renal vein was identified.  Lumbar, gonadal, and adrenal tributaries were clipped and divided as appropriate.  The left renal artery was identified as proximally as feasible.  The adrenal gland was dissected away from the superior pole of the kidney.  The ureter was swept upwards off of the psoas muscle with care taken to avoid traumatizing it.  The posterior and lateral attachments of the kidney were then taken down.  An appropriately-sized vertical incision was created just below the umbilicus for extraction of the kidney.  This was extended just large enough to admit the surgeon's hand.  The Gelport device was placed at this point.  The abdomen was re-insufflated, and the kidney was inspected with conventional laparoscopy to ensure that all non-vascular attachments hand been taken down.  Additional cautery dissection was done as needed.  The patient was given 10 mg of Lasix to ensure a brisk diuresis.  The ureter was then divided distally using a vascular Endo-DARSHAN stapler.  After this was done, the  renal  artery was divided with a vascular stapler followed by the renal vein.  The kidney was then removed through the Gelport and immediately placed on ice and flushed with ice cold UW solution. Attention was then directed to the operative field.  The operative field was thoroughly irrigated and assessed for hemostasis. The port sites were assessed for size of the fascial defect, and external suturing or a specifically designed closure product was used as appropriate.  The kidney extraction incision was closed with continuous #1 PDS.  All incisions were infiltrated with bupivicaine. Skin incisions were closed with 4-0 subcuticular Monocryl.  The patient was then taken from the Operating Room in satisfactory condition.  Prior to the conclusion of the procedure, I was told that all sponge, needle and instrument counts were correct.      Confirmation of Intended Recipient: Prior to the kidney leaving the donor operating room, the correct intended recipient was verified by the attending surgeon and the circulating nurse.      Estimated Blood Loss: 0.34 fl. oz. mL  Drains: None  Specimens: none    Findings:  Healthy-appearing kidney  Arterial anatomy: Single  Venous anatomy: Single  Ureteral anatomy: Single      Times:  Console start:  11/19/2018  8:19 AM  Console finish: 11/19/2018  9:12 AM  Artery divided:  11/19/2018  9:29 AM  Kidney on ice:  11/19/2018  9:30 AM  Flush begin:  11/19/2018  9:31 AM  Flush end:  11/19/2018  9:33 AM  Kidney out of room: 11/19/2018  9:46 AM

## 2018-11-19 NOTE — TRANSFER OF CARE
"Anesthesia Transfer of Care Note    Patient: Bridgett Chua    Procedure(s) Performed: Procedure(s) (LRB):  ROBOTIC NEPHRECTOMY, DONOR (Left)    Patient location: PACU    Anesthesia Type: general    Transport from OR: Transported from OR on 6-10 L/min O2 by face mask with adequate spontaneous ventilation    Post pain: adequate analgesia    Post assessment: no apparent anesthetic complications    Post vital signs: stable    Level of consciousness: sedated    Nausea/Vomiting: no nausea/vomiting    Complications: none    Transfer of care protocol was followed      Last vitals:   Visit Vitals  /62   Pulse 61   Temp 36.6 °C (97.9 °F) (Temporal)   Resp 16   Ht 5' 1" (1.549 m)   Wt 75.8 kg (167 lb)   LMP 03/01/2018   SpO2 100%   Breastfeeding? No   BMI 31.55 kg/m²     "

## 2018-11-19 NOTE — INTERVAL H&P NOTE
The patient has been examined and the H&P has been reviewed:    I concur with the findings and no changes have occurred since H&P was written.    Anesthesia/Surgery risks, benefits and alternative options discussed and understood by patient/family.          Active Hospital Problems    Diagnosis  POA    Willing to be kidney donor [Z00.5]  Not Applicable      Resolved Hospital Problems   No resolved problems to display.

## 2018-11-19 NOTE — OP NOTE
Certification of Assistant at Surgery       Surgery Date: 11/19/2018     Participating Surgeons:  Surgeon(s) and Role:     * Rosario Vaz MD - Primary     * Vamsi Castaneda MD - Assisting    Procedures:  Procedure(s) (LRB):  ROBOTIC NEPHRECTOMY, DONOR (Left)    Assistant Surgeon's Certification of Necessity:  I understand that section 1842 (b) (6) (d) of the Social Security Act generally prohibits Medicare Part B reasonable charge payment for the services of assistants at surgery in teaching hospitals when qualified residents are available to furnish such services. I certify that the services for which payment is claimed were medically necessary, and that no qualified resident was available to perform the services. I further understand that these services are subject to post-payment review by the Medicare carrier.      Vamsi Castaneda MD    11/19/2018  10:11 AM

## 2018-11-19 NOTE — OP NOTE
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  The transplant operation itself is described in a separate report.    ABO Confirmation: Immediately following arrival of the donor organ and prior to implantation, a formal ABO confirmation was done according to hospital and UNOS policies.  I confirmed the UNOS ID number of the donor organ and the donor and recipient ABO types, directly verifying these data by comparison with the UNOS [employer] report.  This confirmation was personally done by an attending surgeon and circulating nurse, and is officially documented elsewhere.    Time-Out: A complete time out was carried out prior to the procedure, with confirmation of patient identity, correct procedure, correct operative site, appropriate antibiotic prophylaxis, review of any known allergies, and presence of all needed equipment.    Procedure in Detail  Prior to starting the operation, the   was prepared on the back table. Arterial anatomy was . Venous anatomy was . Ureteral anatomy was . Back table vascular reconstruction  .  Unneeded fat was removed from the kidney, the vessels were cleaned of adherent tissue and tested for leaks, and the kidney was maintained at ice temperature in organ preservation solution until it was brought to the operative field.

## 2018-11-19 NOTE — NURSING TRANSFER
Nursing Transfer Note      11/19/2018     Transfer To: [id number]    Transfer via bed    Transfer with RA    Transported by transport    Medicines sent: raymond jaramillo    Chart send with patient: Yes    Notified: daughter    Patient reassessed at: 11/19/18 see flowsheets

## 2018-11-20 PROBLEM — K59.09 OTHER CONSTIPATION: Status: ACTIVE | Noted: 2018-11-20

## 2018-11-20 PROBLEM — Z90.5 S/P NEPHRECTOMY: Status: ACTIVE | Noted: 2018-11-20

## 2018-11-20 LAB
ALBUMIN SERPL BCP-MCNC: 3.1 G/DL
ALP SERPL-CCNC: 65 U/L
ALT SERPL W/O P-5'-P-CCNC: 11 U/L
ANION GAP SERPL CALC-SCNC: 6 MMOL/L
AST SERPL-CCNC: 22 U/L
BASOPHILS # BLD AUTO: 0.03 K/UL
BASOPHILS NFR BLD: 0.2 %
BILIRUB SERPL-MCNC: 1.2 MG/DL
BUN SERPL-MCNC: 9 MG/DL
CALCIUM SERPL-MCNC: 8.2 MG/DL
CHLORIDE SERPL-SCNC: 104 MMOL/L
CO2 SERPL-SCNC: 26 MMOL/L
CREAT SERPL-MCNC: 1.4 MG/DL
DIFFERENTIAL METHOD: ABNORMAL
EOSINOPHIL # BLD AUTO: 0 K/UL
EOSINOPHIL NFR BLD: 0.3 %
ERYTHROCYTE [DISTWIDTH] IN BLOOD BY AUTOMATED COUNT: 12.4 %
EST. GFR  (AFRICAN AMERICAN): 49.8 ML/MIN/1.73 M^2
EST. GFR  (NON AFRICAN AMERICAN): 43.2 ML/MIN/1.73 M^2
GLUCOSE SERPL-MCNC: 101 MG/DL
HCT VFR BLD AUTO: 32.3 %
HCT VFR BLD AUTO: 33.3 %
HGB BLD-MCNC: 10.8 G/DL
IMM GRANULOCYTES # BLD AUTO: 0.05 K/UL
IMM GRANULOCYTES NFR BLD AUTO: 0.4 %
LYMPHOCYTES # BLD AUTO: 1.8 K/UL
LYMPHOCYTES NFR BLD: 13 %
MCH RBC QN AUTO: 30.2 PG
MCHC RBC AUTO-ENTMCNC: 33.4 G/DL
MCV RBC AUTO: 90 FL
MONOCYTES # BLD AUTO: 1 K/UL
MONOCYTES NFR BLD: 7.2 %
NEUTROPHILS # BLD AUTO: 11 K/UL
NEUTROPHILS NFR BLD: 78.9 %
NRBC BLD-RTO: 0 /100 WBC
PLATELET # BLD AUTO: 193 K/UL
PMV BLD AUTO: 12.2 FL
POTASSIUM SERPL-SCNC: 3.6 MMOL/L
PROT SERPL-MCNC: 6.4 G/DL
RBC # BLD AUTO: 3.58 M/UL
SODIUM SERPL-SCNC: 136 MMOL/L
WBC # BLD AUTO: 13.97 K/UL

## 2018-11-20 PROCEDURE — 85025 COMPLETE CBC W/AUTO DIFF WBC: CPT

## 2018-11-20 PROCEDURE — 36415 COLL VENOUS BLD VENIPUNCTURE: CPT

## 2018-11-20 PROCEDURE — 80053 COMPREHEN METABOLIC PANEL: CPT

## 2018-11-20 PROCEDURE — 25000003 PHARM REV CODE 250: Performed by: NURSE PRACTITIONER

## 2018-11-20 PROCEDURE — 25000003 PHARM REV CODE 250: Performed by: TRANSPLANT SURGERY

## 2018-11-20 PROCEDURE — 99232 SBSQ HOSP IP/OBS MODERATE 35: CPT | Mod: 24,,, | Performed by: NURSE PRACTITIONER

## 2018-11-20 PROCEDURE — 20600001 HC STEP DOWN PRIVATE ROOM

## 2018-11-20 PROCEDURE — 63600175 PHARM REV CODE 636 W HCPCS: Performed by: TRANSPLANT SURGERY

## 2018-11-20 RX ORDER — DOCUSATE SODIUM 100 MG/1
100 CAPSULE, LIQUID FILLED ORAL 3 TIMES DAILY PRN
Refills: 0 | COMMUNITY
Start: 2018-11-20

## 2018-11-20 RX ORDER — BISACODYL 5 MG
10 TABLET, DELAYED RELEASE (ENTERIC COATED) ORAL DAILY PRN
Refills: 0 | COMMUNITY
Start: 2018-11-20

## 2018-11-20 RX ADMIN — DOCUSATE SODIUM 100 MG: 100 CAPSULE, LIQUID FILLED ORAL at 08:11

## 2018-11-20 RX ADMIN — CEFAZOLIN 2 G: 330 INJECTION, POWDER, FOR SOLUTION INTRAMUSCULAR; INTRAVENOUS at 08:11

## 2018-11-20 RX ADMIN — BISACODYL 10 MG: 5 TABLET, COATED ORAL at 08:11

## 2018-11-20 RX ADMIN — OXYCODONE HYDROCHLORIDE AND ACETAMINOPHEN 1 TABLET: 5; 325 TABLET ORAL at 08:11

## 2018-11-20 RX ADMIN — OXYCODONE HYDROCHLORIDE AND ACETAMINOPHEN 2 TABLET: 5; 325 TABLET ORAL at 02:11

## 2018-11-20 RX ADMIN — ONDANSETRON 4 MG: 4 TABLET, ORALLY DISINTEGRATING ORAL at 08:11

## 2018-11-20 RX ADMIN — DOCUSATE SODIUM 100 MG: 100 CAPSULE, LIQUID FILLED ORAL at 02:11

## 2018-11-20 RX ADMIN — KETOROLAC TROMETHAMINE 15 MG: 30 INJECTION, SOLUTION INTRAMUSCULAR at 05:11

## 2018-11-20 RX ADMIN — KETOROLAC TROMETHAMINE 15 MG: 30 INJECTION, SOLUTION INTRAMUSCULAR at 12:11

## 2018-11-20 RX ADMIN — HEPARIN SODIUM 5000 UNITS: 5000 INJECTION, SOLUTION INTRAVENOUS; SUBCUTANEOUS at 05:11

## 2018-11-20 RX ADMIN — OXYCODONE HYDROCHLORIDE AND ACETAMINOPHEN 2 TABLET: 5; 325 TABLET ORAL at 08:11

## 2018-11-20 NOTE — CONSULTS
" [facility]  Adult Nutrition  Consult Note     SUMMARY       Recommendations  Recommendation/Intervention:   1.Continue diet order as tolerated   2. Encourage good po intake.   3. If po intake <50%, recommend OS.   4. Dietitian Following    Goals: Patient to meet >85% of EEN/EPN  Nutrition Goal Status: new  Communication of EZIO Recs: (POC)    General Information Comments: Consult received- Donor Nephrectomy, 11/19/18. Decreased po intake related to incisional pain. Denies N/V. Recommended increased protein and fluid consumption. Patient appears nourished. Malnutrition not indicated at this time.    Nutrition Discharge Planning: Provided pt with post surgery/nephrectomy recommendations. General healthy diet encouraged. Increased protein and fluid consumption recommended. No other needs identified.     Reason for Assessment  Reason for Assessment: consult  Diagnosis: transplant/postoperative complications(Donor Nephrectomy 11/19/18)  Relevant Medical History: HLD  Interdisciplinary Rounds: did not attend    Nutrition Risk Screen  Nutrition Risk Screen: no indicators present    Nutrition/Diet History  Food Preferences: noted  Do you have any cultural, spiritual, Caodaism conflicts, given your current situation?: none  Food Allergies: NKFA  Factors Affecting Nutritional Intake: pain(Incisional pain)    Anthropometrics  Temp: 98.9 °F (37.2 °C)  Height Method: Stated  Height: 5' 1" (154.9 cm)  Height (inches): 61 in  Weight Method: Stated  Weight: 75.8 kg (167 lb)  Weight (lb): 167 lb  Ideal Body Weight (IBW), Female: 105 lb  % Ideal Body Weight, Female (lb): 159.05 lb  BMI (Calculated): 31.6  BMI Grade: 30 - 34.9- obesity - grade I     Lab/Procedures/Meds  Labs: Reviewed    11/20/2018    K 3.6 11/20/2018    BUN 9 11/20/2018    CREATININE 1.4 11/20/2018    CALCIUM 8.2 (L) 11/20/2018    ESTGFRAFRICA 49.8 (A) 11/20/2018    ALBUMIN 3.1 (L) 11/20/2018      ALT 11 11/20/2018    AST 22 11/20/2018    " ALKPHOS 65 11/20/2018     Meds: Reviewed  Scheduled Meds:   bisacodyl  10 mg Oral Daily    docusate sodium  100 mg Oral TID    heparin (porcine)  5,000 Units Subcutaneous Q8H    ketorolac  15 mg Intravenous Q6H     Physical Findings/Assessment  Overall Physical Appearance: nourished  Oral/Mouth Cavity: WDL  Skin: incision(s)    Estimated/Assessed Needs  Weight Used For Calorie Calculations: 75.8 kg (167 lb 1.7 oz)  Energy Calorie Requirements (kcal): 1895  Energy Need Method: Kcal/kg(25 kcal/kg)  Protein Requirements: 75-98(1.0-1.3 gm/kg)  Weight Used For Protein Calculations: 75.8 kg (167 lb 1.7 oz)  Fluid Requirements (mL): 1mL/kcal or per MD  Fluid Need Method: RDA Method(1mL/kcal or per MD)  RDA Method (mL): 1895     Nutrition Prescription Ordered  Current Diet Order: Regular    Evaluation of Received Nutrient/Fluid Intake in last 24h  I/O: +3.2L x 24hrs, +3.3L since admit  Comments: No BM recorded  % Intake of Estimated Energy Needs: 0 - 25 %  % Meal Intake: 0 - 25 %    Nutrition Risk  Level of Risk/Frequency of Follow-up: low(1x week)     Assessment and Plan  Nutrition Problem  Increased nutrient needs     Related to (etiology):   Physiological causes related to healing     Signs and Symptoms (as evidenced by):   S/p Donor Nephrectomy, 11/19/18    Interventions/Recommendations (treatment strategy):  Increased calorie and protein diet    Nutrition Diagnosis Status:   New    Monitor and Evaluation  Food and Nutrient Intake: energy intake, food and beverage intake  Food and Nutrient Adminstration: diet order  Anthropometric Measurements: weight, weight change  Biochemical Data, Medical Tests and Procedures: electrolyte and renal panel, gastrointestinal profile, glucose/endocrine profile, inflammatory profile, lipid profile  Nutrition-Focused Physical Findings: overall appearance     Nutrition Follow-Up  RD Follow-up?: Yes

## 2018-11-20 NOTE — PLAN OF CARE
Problem: Patient Care Overview  Goal: Plan of Care Review  Outcome: Ongoing (interventions implemented as appropriate)    Pt AAOx4   Pt ambulated in solo with 1-assist and assistance with IV pole and Estrada.   Pt voided 1L in clear yellow urine with no foul odor.   Estrada catheter pulled at 0600.   No BM.   Midline incision SALAZAR with dermabond and 3x lap sites CDI.   Pt given Percocet 5 mg once for pain.    Pt receiving d5 half NS @ 125 mL/hr.   No changes over night.   Pt remained free from falls or injury thus far. Bed is in low/ locked position, side rails are up x 2, call light is in reach.   Will continue to monitor.

## 2018-11-20 NOTE — PLAN OF CARE
Problem: Patient Care Overview  Goal: Plan of Care Review  Outcome: Ongoing (interventions implemented as appropriate)  Recommendation/Intervention:   1.Continue diet order as tolerated   2. Encourage good po intake.   3. If po intake <50%, recommend OS.   4. Dietitian Following    Goals: Patient to meet >85% of EEN/EPN  Nutrition Goal Status: new    Nutrition Discharge Planning: Provided pt with post surgery/nephrectomy recommendations. General healthy diet encouraged. Increased protein and fluid consumption recommended. No other needs identified.     Full assessment completed, see Dietitian Note 11/20/2018.

## 2018-11-20 NOTE — PROGRESS NOTES
[facility]  Kidney Transplant  Progress Note      Reason for Follow-up: Reassessment of  recipient and management of immunosuppression.      Subjective:   History of Present Illness:  Ms. Chua is a 53 y.o. year old Black or  female who has been approved to be a living unrelated donor for brother in law.  She denies any changes in her health condition since her previous visit.       Hospital Course:  53 y.o. year-old female who  is s/p a  left  robotic nephrectomy is planned. Midline incision with dermabond.  Having some nausea and decent amount of pain this am.  Increase pain medication and encouraged to ambulate.        Past Medical, Surgical, Family, and Social History:   Unchanged from H&P.    Scheduled Meds:   bisacodyl  10 mg Oral Daily    docusate sodium  100 mg Oral TID    heparin (porcine)  5,000 Units Subcutaneous Q8H    ketorolac  15 mg Intravenous Q6H     Continuous Infusions:  PRN Meds:ondansetron, ondansetron, oxyCODONE-acetaminophen, oxyCODONE-acetaminophen    Intake/Output - Last 3 Shifts       11/18 0700 - 11/19 0659 11/19 0700 - 11/20 0659 11/20 0700 - 11/21 0659    P.O.  630 570    I.V. (mL/kg) 100 (1.3) 4879.2 (64.5) 377.1 (5)    Total Intake(mL/kg) 100 (1.3) 5509.2 (72.8) 947.1 (12.5)    Urine (mL/kg/hr)  2230 (1.2) 600 (0.9)    Stool  0 0    Total Output  2230 600    Net +100 +3279.2 +347.1           Urine Occurrence   0 x    Stool Occurrence  0 x 0 x           Review of Systems   Constitutional: Positive for activity change and appetite change.   Gastrointestinal: Positive for abdominal pain, constipation and nausea.   Neurological: Positive for weakness.      Objective:     Vital Signs (Most Recent):  Temp: 98 °F (36.7 °C) (11/20/18 1043)  Pulse: 97 (11/20/18 1043)  Resp: 18 (11/20/18 1043)  BP: 130/74 (11/20/18 1043)  SpO2: 96 % (11/20/18 1043) Vital Signs (24h Range):  Temp:  [98 °F (36.7 °C)-98.9 °F (37.2 °C)] 98 °F (36.7 °C)  Pulse:  []  "97  Resp:  [14-21] 18  SpO2:  [92 %-97 %] 96 %  BP: (119-132)/(74-87) 130/74     Weight: 75.8 kg (167 lb)  Height: 5' 1" (154.9 cm)  Body mass index is 31.55 kg/m².    Physical Exam   Constitutional: She is oriented to person, place, and time. She appears well-developed.   HENT:   Head: Normocephalic and atraumatic.   Eyes: Pupils are equal, round, and reactive to light.   Neck: Normal range of motion.   Cardiovascular: Normal rate, regular rhythm, normal heart sounds and intact distal pulses.   Pulmonary/Chest: Effort normal and breath sounds normal.   Abdominal: Soft. Bowel sounds are normal.       Musculoskeletal: Normal range of motion.   Neurological: She is alert and oriented to person, place, and time.   Skin: Skin is warm and dry.   Psychiatric: She has a normal mood and affect. Her behavior is normal.   Nursing note and vitals reviewed.      Laboratory:  CBC:   Recent Labs   Lab 11/19/18  1705 11/19/18  2350 11/20/18  0630   WBC  --   --  13.97*   RBC  --   --  3.58*   HGB  --   --  10.8*   HCT 35.9* 33.3* 32.3*   PLT  --   --  193   MCV  --   --  90   MCH  --   --  30.2   MCHC  --   --  33.4     CMP:   Recent Labs   Lab 11/20/18  0630      CALCIUM 8.2*   ALBUMIN 3.1*   PROT 6.4      K 3.6   CO2 26      BUN 9   CREATININE 1.4   ALKPHOS 65   ALT 11   AST 22       Diagnostic Results:  None    Assessment/Plan:     Other constipation    - start bowel regimen  - encourage ambulation       S/p nephrectomy    - Midline incision with dermabond  - creatinine stable  - voiding, making adequate urine           Discharge Planning:  Likely d/c in am      Ofe Cabrales NP  Kidney Transplant  [facility]  "

## 2018-11-20 NOTE — PROGRESS NOTES
Pt noted to only have 50 mL urine output over last 2 hours.   MD on call for KTS placed order for 500 mL NS bolus. Will administer and continue to monitor.

## 2018-11-20 NOTE — HOSPITAL COURSE
53 y.o. year-old female who is s/p a left  robotic nephrectomy is planned. Midline incision with dermabond.  Having some nausea and decent amount of pain this am.  Increase pain medication and encouraged to ambulate.

## 2018-11-20 NOTE — PROGRESS NOTES
Admit Note     Met with patient and cousin  to assess needs. Patient is a 53 y.o. single female, admitted for for living kidney donation to brother-in-law.    Patient admitted from home on 11/19/2018 .  At this time, patient presents as alert and oriented x 4, pleasant, good eye contact, well groomed, recall good, concentration/judgement good, average intelligence, calm, communicative, cooperative and asking and answering questions appropriately.  At this time, patients caregiver presents as alert and oriented x 4, pleasant, good eye contact, well groomed, recall good, concentration/judgement good, average intelligence, calm, communicative, cooperative and asking and answering questions appropriately.    Household/Family Systems     Patient resides [facility] patient's self, [facility] [address] [address].  Support system includes parents, siblings and host of extended family.  Patient does not have dependents that are need of being cared for.     Patients primary caregiver is Venita Vega  , patients cousin , phone number [phone].  Confirmed patients contact information is [phone] (home);   Telephone Information:   Mobile [phone]   .    During admission, patient's caregiver plans to stay in patient's room.  Confirmed patient and patients caregivers do have access to reliable transportation.    Cognitive Status/Learning     Patient reports reading ability as college and states patient does not have difficulty with reading, writing, seeing, hearing, comprehension, learning and memory.  Patient reports patient learns best by hands-on instruction.   Needed: No.   Highest education level: Attended College/Technical School    Vocation/Disability   .  Working for Income: yes  If yes, working activity level: Working Full Time  Patient is employed as a  at [employer].    Adherence     Patient reports a high level of adherence to patients health care regimen.   Adherence counseling and education provided. Patient verbalizes understanding.    Substance Use    Patient reports the following substance usage.    Tobacco: none, patient denies any use.  Alcohol: none, patient denies any use.  Illicit Drugs/Non-prescribed Medications: none, patient denies any use.  Patient states clear understanding of the potential impact of substance use.  Substance abstinence/cessation counseling, education and resources provided and reviewed.     Services Utilizing/ADLS    Infusion Service: Prior to admission, patient utilizing? no  Home Health: Prior to admission, patient utilizing? no  DME: Prior to admission, no  Pulmonary/Cardiac Rehab: Prior to admission, no  Dialysis:  Prior to admission, no  Transplant Specialty Pharmacy:  Prior to admission, yes; [facility].    Prior to admission, patient reports patient was independent with ADLS and was driving.  Patient reports patient is not able to care for self at this time due to compromised medical condition (as documented in medical record) and physical weakness..  Patient indicates a willingness to care for self once medically cleared to do so.    Insurance/Medications    Insured by   Payor/Plan Subscr  Sex Relation Sub. Ins. ID Effective Group Num   1. BLUE CROSS BLALISE PICHARDO 1967 Male  [id number]                                   [address] [address]   2. ALISE TURONG 1967 Male  [id number] Not Eff                                    [address] [address]      Primary Insurance (for UNOS reporting): Private Insurance  Secondary Insurance (for UNOS reporting): Private Insurance    Patient reports patient is able to obtain and afford medications at this time and at time of discharge.    Living Will/Healthcare Power of     Patient states patient does not have a LW and/or HCPA.   provided education regarding LW and HCPA and the completion of forms.    Coping/Mental Health    Patient is coping  adequately with the aid of  family members and friends.  Patient denies mental health difficulties.     Discharge Planning    At time of discharge, patient plans to return [facility] under the care of Venita.  Patients cousin  will transport patient.  Per rounds today, expected discharge date is today or tomorrow 11/21/2018. Patient and patients caregiver  verbalize understanding and are involved in treatment planning and discharge process.    Additional Concerns    Patient's caretaker denies additional needs and/or concerns at this time. Patient is being followed for needs, education, resources, information, emotional support, supportive counseling, and for supportive and skilled discharge plan of care. Patient's caregiver verbalizes understanding and agreement with information reviewed,  availability and how to access available resources as needed. Patient denies additional needs and/or concerns at this time. Patient verbalizes understanding and agreement with information reviewed, social work availability, and how to access available resources as needed.

## 2018-11-20 NOTE — PROGRESS NOTES
Discharge Education/Note:  Pt resting in chair.  Reports pain is well controlled and voiding without difficulty.  Below discharge instructions reviewed with patient and caregiver.  All questions answered and patient verbalized understanding.    KIDNEY DONOR DISCHARGE INSTRUCTIONS    1. No heavy lifting (greater than 10-15 pounds) for six weeks.  2. Showers only, for the first two weeks after surgery.   You can take a tub bath after two weeks or when your incision is completely healed.     3. Clean the incision in the shower with a mild soap and water, rinse and dry.   4.   Call the outpatient kidney transplant coordinator Monday through Friday 8:00 a.m. - 4:30 p.m. at [phone] or [phone], ext. [phone] if calling long distance.  After hours, on weekends and holidays call [phone] or [phone] and you will be directed to [facility] for the following:    Signs and symptoms of wound infection  · Redness and/or swelling of the wound  · Drainage from the wound  · Temperature > 101.0 F   · Wound opening or separation     Signs and symptoms of urinary tract infection  · Frequency of urination or problems urinating  · Burning during urination  · Cloudy or bloody urine  · Foul smelling urine  · Temperature > 101.0  Any other medical problems in the immediate discharge period which are of concern to you.    4. You will need to see the transplant doctor approximately four weeks after discharge.  Blood and urine specimens will be obtained two hours prior to your appointment.  If you did not receive the appointments upon discharge you can schedule your appointments by calling [phone] or [phone], ext. [phone].   5. Discuss with the doctor at your clinic appointment when you can return to driving and work.  If all goes well, usually this is within 4 to 6 weeks.    6. Six months after donating your kidney, you will again need lab work and a checkup with your doctor. As well as on a yearly  basis.  Our office will need copies of these records for the first 2 years after donation.  Please have your doctor fax them to us at [phone].              *YOU SHOULD ALWAYS HAVE YEARLY MEDICAL CHECKUPS WITH YOUR LOCAL PHYSICIAN, INCLUDING BLOOD WORK TO EVALUATE YOUR KIDNEY FUNCTION.   *Avoid Advil. Motrin, Aleive and other Non-Steriodals, these can be toxic to your kidney.    *Tylenol is okay.              Dear Kidney Donor,    Thank you so much for your heroic gift.  One never really knows, if they will be called to be a hero and whether or not they will be able to. You have accomplished this in our eyes, as well as, your recipients.  This is truly an amazing gift that you have given.    At discharge, you will be given a follow up appointment for lab work and a surgical visit which will occur about 4 weeks after donation.  At this appointment you will discuss with the physician when you will be able to return to normal activities, including work, as well as any other concerns you may have.      In order to ensure your health after donation, as well the health of future donors, we will need to do some routine follow up.  This will entail a call from our transplant office at   6 months, 1 year and 2 years after donation.   We are required to send information to UN[employer] ([employer]) on your progress and health at these intervals.    We will inquire about the followin. Current weight  2. Diagnostic tests done since our last call (CT scan, MRI, Ultrasound)  3. Lab work results including a creatinine and urinalysis  4. Blood Pressure reading  5. Any new medical conditions such as kidney problems, diabetes or hypertension  6. Admission to the hospital, if so, for what reason     It is very important that after donation you establish with a Primary Care Physician to maintain your health.  We ask that you see your physician at 6 months after donation, and then yearly.  These visits should  include a complete physical exam, as well as blood work, including complete chemistries and urinalysis.  Please have your physician fax these lab results and clinic notes to us at [phone].    If there is anything we can help you with please call us at [phone].  We sincerely hope your donation experience was a pleasant one and wish you good health and well being.        Your Transplant Team at [facility]

## 2018-11-20 NOTE — SUBJECTIVE & OBJECTIVE
"  Subjective:   History of Present Illness:  Ms. Chua is a 53 y.o. year old Black or  female who has been approved to be a living unrelated donor for brother in law.  She denies any changes in her health condition since her previous visit.       Hospital Course:  53 y.o. year-old female who  is s/p a  left  robotic nephrectomy is planned. Midline incision with dermabond.  Having some nausea and decent amount of pain this am.  Increase pain medication and encouraged to ambulate.        Past Medical, Surgical, Family, and Social History:   Unchanged from H&P.    Scheduled Meds:   bisacodyl  10 mg Oral Daily    docusate sodium  100 mg Oral TID    heparin (porcine)  5,000 Units Subcutaneous Q8H    ketorolac  15 mg Intravenous Q6H     Continuous Infusions:  PRN Meds:ondansetron, ondansetron, oxyCODONE-acetaminophen, oxyCODONE-acetaminophen    Intake/Output - Last 3 Shifts       11/18 0700 - 11/19 0659 11/19 0700 - 11/20 0659 11/20 0700 - 11/21 0659    P.O.  630 570    I.V. (mL/kg) 100 (1.3) 4879.2 (64.5) 377.1 (5)    Total Intake(mL/kg) 100 (1.3) 5509.2 (72.8) 947.1 (12.5)    Urine (mL/kg/hr)  2230 (1.2) 600 (0.9)    Stool  0 0    Total Output  2230 600    Net +100 +3279.2 +347.1           Urine Occurrence   0 x    Stool Occurrence  0 x 0 x           Review of Systems   Constitutional: Positive for activity change and appetite change.   Gastrointestinal: Positive for abdominal pain, constipation and nausea.   Neurological: Positive for weakness.      Objective:     Vital Signs (Most Recent):  Temp: 98 °F (36.7 °C) (11/20/18 1043)  Pulse: 97 (11/20/18 1043)  Resp: 18 (11/20/18 1043)  BP: 130/74 (11/20/18 1043)  SpO2: 96 % (11/20/18 1043) Vital Signs (24h Range):  Temp:  [98 °F (36.7 °C)-98.9 °F (37.2 °C)] 98 °F (36.7 °C)  Pulse:  [] 97  Resp:  [14-21] 18  SpO2:  [92 %-97 %] 96 %  BP: (119-132)/(74-87) 130/74     Weight: 75.8 kg (167 lb)  Height: 5' 1" (154.9 cm)  Body mass index is 31.55 " kg/m².    Physical Exam   Constitutional: She is oriented to person, place, and time. She appears well-developed.   HENT:   Head: Normocephalic and atraumatic.   Eyes: Pupils are equal, round, and reactive to light.   Neck: Normal range of motion.   Cardiovascular: Normal rate, regular rhythm, normal heart sounds and intact distal pulses.   Pulmonary/Chest: Effort normal and breath sounds normal.   Abdominal: Soft. Bowel sounds are normal.       Musculoskeletal: Normal range of motion.   Neurological: She is alert and oriented to person, place, and time.   Skin: Skin is warm and dry.   Psychiatric: She has a normal mood and affect. Her behavior is normal.   Nursing note and vitals reviewed.      Laboratory:  CBC:   Recent Labs   Lab 11/19/18  1705 11/19/18  2350 11/20/18  0630   WBC  --   --  13.97*   RBC  --   --  3.58*   HGB  --   --  10.8*   HCT 35.9* 33.3* 32.3*   PLT  --   --  193   MCV  --   --  90   MCH  --   --  30.2   MCHC  --   --  33.4     CMP:   Recent Labs   Lab 11/20/18  0630      CALCIUM 8.2*   ALBUMIN 3.1*   PROT 6.4      K 3.6   CO2 26      BUN 9   CREATININE 1.4   ALKPHOS 65   ALT 11   AST 22       Diagnostic Results:  None

## 2018-11-20 NOTE — HPI
Ms. Chua is a 53 y.o. year old Black or  female who has been approved to be a living unrelated donor for brother in law.  She denies any changes in her health condition since her previous visit.

## 2018-11-21 VITALS
SYSTOLIC BLOOD PRESSURE: 142 MMHG | DIASTOLIC BLOOD PRESSURE: 97 MMHG | RESPIRATION RATE: 20 BRPM | TEMPERATURE: 99 F | HEIGHT: 61 IN | WEIGHT: 167 LBS | BODY MASS INDEX: 31.53 KG/M2 | OXYGEN SATURATION: 97 % | HEART RATE: 103 BPM

## 2018-11-21 PROCEDURE — 25000003 PHARM REV CODE 250: Performed by: TRANSPLANT SURGERY

## 2018-11-21 PROCEDURE — 25000003 PHARM REV CODE 250: Performed by: NURSE PRACTITIONER

## 2018-11-21 PROCEDURE — 99238 HOSP IP/OBS DSCHRG MGMT 30/<: CPT | Mod: ,,, | Performed by: NURSE PRACTITIONER

## 2018-11-21 RX ORDER — OXYCODONE AND ACETAMINOPHEN 10; 325 MG/1; MG/1
.5-1 TABLET ORAL EVERY 4 HOURS PRN
Qty: 40 TABLET | Refills: 0 | Status: SHIPPED | OUTPATIENT
Start: 2018-11-21

## 2018-11-21 RX ADMIN — DOCUSATE SODIUM 100 MG: 100 CAPSULE, LIQUID FILLED ORAL at 08:11

## 2018-11-21 RX ADMIN — OXYCODONE HYDROCHLORIDE AND ACETAMINOPHEN 2 TABLET: 5; 325 TABLET ORAL at 08:11

## 2018-11-21 RX ADMIN — BISACODYL 10 MG: 5 TABLET, COATED ORAL at 08:11

## 2018-11-21 NOTE — DISCHARGE SUMMARY
[facility]  Kidney Transplant  Discharge Summary    Patient Name: Bridgett Chua  MRN: [id number]  Admission Date: 11/19/2018  Hospital Length of Stay: 2 days  Discharge Date and Time:  11/21/2018 1:42 PM  Attending Physician: No att. providers found   Discharging Provider: Ofe Cabrales NP  Primary Care Provider: VIVIANE Adair    HPI:   Ms. Chua is a 53 y.o. year old Black or  female who has been approved to be a living unrelated donor for brother in law.  She denies any changes in her health condition since her previous visit.         Procedure(s) (LRB):  ROBOTIC NEPHRECTOMY, DONOR (Left)     Hospital Course:    53 y.o. year-old female who  is s/p a  left  robotic nephrectomy is planned. Midline incision with dermabond.  Had some nausea and decent amount of painpost.  Increased pain medication, given symptomatic antiemetics, and encouraged to ambulate.  With medication adjustments, pain more tolerable and nausea resolved.    Patient now stable and ready for discharge.  Will have labs and clinic appointment on Friday 11/23.        Final Active Diagnoses:    Diagnosis Date Noted POA    PRINCIPAL PROBLEM:  S/p nephrectomy [Z90.5] 11/20/2018 Not Applicable    Other constipation [K59.09] 11/20/2018 No    Willing to be kidney donor [Z00.5]  Not Applicable      Problems Resolved During this Admission:       Treatments: surgery: nephrectomy    Consults (From admission, onward)        Status Ordering Provider     Inpatient consult to Registered Dietitian/Nutritionist  Once     Provider:  (Not yet assigned)    Completed LISBET BENAVIDEZ          Pending Diagnostic Studies:     None        Significant Diagnostic Studies: Labs:   CMP   Recent Labs   Lab 11/20/18  0630      K 3.6      CO2 26      BUN 9   CREATININE 1.4   CALCIUM 8.2*   PROT 6.4   ALBUMIN 3.1*   BILITOT 1.2*   ALKPHOS 65   AST 22   ALT 11   ANIONGAP 6*   ESTGFRAFRICA 49.8*   EGFRNONAA 43.2*     and CBC   Recent Labs   Lab 11/20/18  0630   WBC 13.97*   HGB 10.8*   HCT 32.3*          Discharged Condition: good    Disposition: Home or Self Care    Follow Up:    Patient Instructions:      Notify your health care provider if you experience any of the following:  temperature >100.4     Notify your health care provider if you experience any of the following:  persistent nausea and vomiting or diarrhea     Notify your health care provider if you experience any of the following:  severe uncontrolled pain     Notify your health care provider if you experience any of the following:  redness, tenderness, or signs of infection (pain, swelling, redness, odor or green/yellow discharge around incision site)     Notify your health care provider if you experience any of the following:  difficulty breathing or increased cough     Notify your health care provider if you experience any of the following:  severe persistent headache     Notify your health care provider if you experience any of the following:  worsening rash     Notify your health care provider if you experience any of the following:  persistent dizziness, light-headedness, or visual disturbances     Notify your health care provider if you experience any of the following:  increased confusion or weakness     No dressing needed     Activity as tolerated     Medications:  Reconciled Home Medications:      Medication List      START taking these medications    bisacodyl 5 mg EC tablet  Commonly known as:  DULCOLAX  Take 2 tablets (10 mg total) by mouth daily as needed for Constipation.     docusate sodium 100 MG capsule  Commonly known as:  COLACE  Take 1 capsule (100 mg total) by mouth 3 (three) times daily as needed for Constipation.     oxyCODONE-acetaminophen  mg per tablet  Commonly known as:  PERCOCET  Take 0.5 to 1 tablet by mouth every 4 (four) hours as needed for Pain.        CONTINUE taking these medications    ADDERALL 20 mg  tablet  Generic drug:  dextroamphetamine-amphetamine  Take 1 tablet by mouth once daily.     simvastatin 40 MG tablet  Commonly known as:  ZOCOR  Take 40 mg by mouth every evening.          Time spent caring for patient (Greater than 1/2 spent in direct face-to-face contact): < 30 minutes    Ofe Cabrales NP  Kidney Transplant  [facility]

## 2018-11-21 NOTE — NURSING
Pt AAOx4, VSS, afebrile.  C/o incisional pain with prn PO percocet.  Fall risk precautions initiated.  Pt in lowest bed position setting, lighting adjusted, pt to wear nonskid socks when ambulating, side rails up x2.  Pt remain free from falls during shift.  Pt verbalize understanding to call when needed assistance. Call light within reach.  Will continue to monitor.

## 2018-11-21 NOTE — PROGRESS NOTES
Plan to discharge today and RTC on 11/23/18 to see surgeon and coordinator with follow up labs.  After hours number provided to patient and caregiver and issues to contact team regarding were reviewed.  All questions answered and patient verbalized understanding.

## 2018-11-21 NOTE — PROGRESS NOTES
DONOR NEPHRECTOMY EDUCATION & DISCHARGE NOTE:    Discharge medications are to include pain control as oxycodone -acetaminophen  mg (#40) and Colace/Dulcolax while taking pain medications to prevent constipation.  Patient was counseled at discharged regarding the side effects of pain medication, including drowsiness, constipation, nausea and counseled not to operate a car while taking pain medication or take Tylenol (acetaminophen) containing medications while taking pain medication.  Patient verbalized understanding.

## 2018-11-21 NOTE — NURSING
DC'd from unit per MD orders. Visi monitor removed. Peripheral IVs removed. Left unit via wheelchair with caregiver at side.

## 2018-11-23 ENCOUNTER — CLINICAL SUPPORT (OUTPATIENT)
Dept: TRANSPLANT | Facility: CLINIC | Age: 53
DRG: 661 | End: 2018-11-23
Attending: TRANSPLANT SURGERY
Payer: COMMERCIAL

## 2018-11-23 ENCOUNTER — LAB VISIT (OUTPATIENT)
Dept: LAB | Facility: HOSPITAL | Age: 53
End: 2018-11-23
Attending: TRANSPLANT SURGERY
Payer: COMMERCIAL

## 2018-11-23 VITALS
HEART RATE: 97 BPM | OXYGEN SATURATION: 97 % | OXYGEN SATURATION: 97 % | HEIGHT: 61 IN | SYSTOLIC BLOOD PRESSURE: 145 MMHG | BODY MASS INDEX: 32.46 KG/M2 | RESPIRATION RATE: 18 BRPM | SYSTOLIC BLOOD PRESSURE: 145 MMHG | TEMPERATURE: 99 F | HEART RATE: 97 BPM | BODY MASS INDEX: 32.46 KG/M2 | DIASTOLIC BLOOD PRESSURE: 94 MMHG | HEIGHT: 61 IN | DIASTOLIC BLOOD PRESSURE: 94 MMHG | TEMPERATURE: 99 F | WEIGHT: 171.94 LBS | WEIGHT: 171.94 LBS | RESPIRATION RATE: 18 BRPM

## 2018-11-23 DIAGNOSIS — Z90.5 S/P NEPHRECTOMY: Primary | ICD-10-CM

## 2018-11-23 DIAGNOSIS — Z00.5 TRANSPLANT DONOR EVALUATION: ICD-10-CM

## 2018-11-23 LAB
BACTERIA #/AREA URNS AUTO: NORMAL /HPF
MICROSCOPIC COMMENT: NORMAL
RBC #/AREA URNS AUTO: 1 /HPF (ref 0–4)
SQUAMOUS #/AREA URNS AUTO: 2 /HPF
WBC #/AREA URNS AUTO: 2 /HPF (ref 0–5)

## 2018-11-23 PROCEDURE — 81001 URINALYSIS AUTO W/SCOPE: CPT

## 2018-11-23 PROCEDURE — 99999 PR PBB SHADOW E&M-EST. PATIENT-LVL III: CPT | Mod: PBBFAC,,,

## 2018-11-23 PROCEDURE — 99024 POSTOP FOLLOW-UP VISIT: CPT | Mod: S$GLB,,, | Performed by: TRANSPLANT SURGERY

## 2018-11-23 NOTE — PROGRESS NOTES
Patient seen in clinic for first post op visit. States she is feeling much better. Bowels have moved, encouraged to continue stool softeners until bowel movements are regular. Using pain medication 2 times a day, informed she can switch to Tylenol when she is able. Reminded of activity and driving restrictions. Encouraged to contact us with any problems or questions. Will return to clinic in 3 weeks.

## 2018-11-23 NOTE — LETTER
November 23, 2018                     [facility]  [address]  [facility] [address]  Phone: [phone]   Patient: Bridgett Chua   MR Number: [id number]   YOB: 1965   Date of Visit: 11/23/2018       Dear      Thank you for referring Bridgett Chua to me for evaluation. Attached you will find relevant portions of my assessment and plan of care.    If you have questions, please do not hesitate to call me. I look forward to following Bridgett Chua along with you.    Sincerely,    KIDNEY SURGERY, TRANSPLANT    Enclosure    If you would like to receive this communication electronically, please contact [email or web address] or [phone] to request [employer] access.    [employer] is a tool which provides read-only access to select patient information with whom you have a relationship. Its easy to use and provides real time access to review your patients record including encounter summaries, notes, results, and demographic information.    If you feel you have received this communication in error or would no longer like to receive these types of communications, please e-mail [email or web address]

## 2018-11-23 NOTE — PROGRESS NOTES
Transplant Surgery Kidney Donor Follow-up    Chief Complaint: Bridgett Chua is a 53 y.o. year old female who  Underwent left robotic/laparoscopic donor nephrectomy on 11/19/18 .  She presents for surgical follow-up.  Current symptoms include incisional pain, nausea and but both are resolving.     Review of Systems   Constitutional: Positive for fatigue. Negative for activity change, appetite change, chills and fever.   HENT: Negative for congestion, nosebleeds, sinus pressure, sore throat and voice change.    Eyes: Negative for pain and visual disturbance.   Respiratory: Negative for cough and shortness of breath.    Cardiovascular: Negative for palpitations and leg swelling.   Gastrointestinal: Positive for nausea. Negative for abdominal distention, abdominal pain, diarrhea and vomiting.   Endocrine: Negative for cold intolerance and heat intolerance.   Genitourinary: Negative for dysuria, flank pain, frequency and hematuria.   Musculoskeletal: Negative for back pain and gait problem.   Skin: Negative for color change, pallor and wound.   Allergic/Immunologic: Negative for food allergies.   Neurological: Negative for dizziness, seizures, numbness and headaches.   Hematological: Negative for adenopathy.   Psychiatric/Behavioral: Negative for agitation, behavioral problems, hallucinations and sleep disturbance.     Physical Exam   Constitutional: She is oriented to person, place, and time. She appears well-developed and well-nourished.   HENT:   Head: Normocephalic and atraumatic.   Eyes: EOM are normal. Pupils are equal, round, and reactive to light.   Cardiovascular: Normal rate and regular rhythm.   Pulmonary/Chest: Effort normal.   Abdominal: Soft. She exhibits no distension. There is no tenderness. There is no guarding.   Incisions c/d/i  Some edema.  Mild bruising.  No erythema   Musculoskeletal: Normal range of motion.   Neurological: She is alert and oriented to person, place, and time.   Skin: Skin is  warm and dry.   Psychiatric: She has a normal mood and affect. Her behavior is normal.     Lab Results   Component Value Date    BUN 9 11/20/2018    CREATININE 1.4 11/20/2018     Lab Results   Component Value Date    WBC 9.77 11/23/2018    HGB 11.4 (L) 11/23/2018    HCT 34.3 (L) 11/23/2018     11/23/2018       Assessment and Plan:  No diagnosis found.    Bridgett is doing well following living kidney donation.  She  is advised to refrain from heavy lifting for a period of two to four weeks from the date of surgery and then gradually resume normal activities. She will return for an additional follow-up visit in approximately  2 weeks.    I discussed the need for our program to be able to contact living donors for UNOS reporting purposes for a minimum of 2 years.  Failure of our center to be able to provide such information could jeopardize our ability to continue to offer living donor transplants.  Bridgett voices understanding and agrees to this follow-up.

## 2018-12-19 DIAGNOSIS — Z00.5 TRANSPLANT DONOR EVALUATION: Primary | ICD-10-CM

## 2018-12-28 ENCOUNTER — LAB VISIT (OUTPATIENT)
Dept: LAB | Facility: HOSPITAL | Age: 53
End: 2018-12-28
Attending: TRANSPLANT SURGERY
Payer: COMMERCIAL

## 2018-12-28 ENCOUNTER — OFFICE VISIT (OUTPATIENT)
Dept: TRANSPLANT | Facility: CLINIC | Age: 53
End: 2018-12-28
Payer: COMMERCIAL

## 2018-12-28 VITALS
TEMPERATURE: 99 F | OXYGEN SATURATION: 100 % | DIASTOLIC BLOOD PRESSURE: 95 MMHG | HEART RATE: 88 BPM | SYSTOLIC BLOOD PRESSURE: 138 MMHG | RESPIRATION RATE: 16 BRPM | BODY MASS INDEX: 32.13 KG/M2 | HEIGHT: 61 IN | WEIGHT: 170.19 LBS

## 2018-12-28 DIAGNOSIS — Z90.5 S/P NEPHRECTOMY: Primary | ICD-10-CM

## 2018-12-28 DIAGNOSIS — Z00.5 TRANSPLANT DONOR EVALUATION: ICD-10-CM

## 2018-12-28 LAB
ALBUMIN SERPL BCP-MCNC: 3.8 G/DL
ANION GAP SERPL CALC-SCNC: 8 MMOL/L
BASOPHILS # BLD AUTO: 0.04 K/UL
BASOPHILS NFR BLD: 0.5 %
BUN SERPL-MCNC: 16 MG/DL
CALCIUM SERPL-MCNC: 9.6 MG/DL
CHLORIDE SERPL-SCNC: 107 MMOL/L
CO2 SERPL-SCNC: 26 MMOL/L
CREAT SERPL-MCNC: 1.4 MG/DL
DIFFERENTIAL METHOD: NORMAL
EOSINOPHIL # BLD AUTO: 0.2 K/UL
EOSINOPHIL NFR BLD: 2.2 %
ERYTHROCYTE [DISTWIDTH] IN BLOOD BY AUTOMATED COUNT: 12.2 %
EST. GFR  (AFRICAN AMERICAN): 49.8 ML/MIN/1.73 M^2
EST. GFR  (NON AFRICAN AMERICAN): 43.2 ML/MIN/1.73 M^2
GLUCOSE SERPL-MCNC: 98 MG/DL
HCT VFR BLD AUTO: 39.5 %
HGB BLD-MCNC: 12.8 G/DL
IMM GRANULOCYTES # BLD AUTO: 0.02 K/UL
IMM GRANULOCYTES NFR BLD AUTO: 0.2 %
LYMPHOCYTES # BLD AUTO: 3.1 K/UL
LYMPHOCYTES NFR BLD: 35.2 %
MCH RBC QN AUTO: 29.7 PG
MCHC RBC AUTO-ENTMCNC: 32.4 G/DL
MCV RBC AUTO: 92 FL
MONOCYTES # BLD AUTO: 0.7 K/UL
MONOCYTES NFR BLD: 8.2 %
NEUTROPHILS # BLD AUTO: 4.7 K/UL
NEUTROPHILS NFR BLD: 53.7 %
NRBC BLD-RTO: 0 /100 WBC
PHOSPHATE SERPL-MCNC: 3.8 MG/DL
PLATELET # BLD AUTO: 218 K/UL
PMV BLD AUTO: 11.9 FL
POTASSIUM SERPL-SCNC: 4.2 MMOL/L
RBC # BLD AUTO: 4.31 M/UL
SODIUM SERPL-SCNC: 141 MMOL/L
WBC # BLD AUTO: 8.69 K/UL

## 2018-12-28 PROCEDURE — 80069 RENAL FUNCTION PANEL: CPT

## 2018-12-28 PROCEDURE — 36415 COLL VENOUS BLD VENIPUNCTURE: CPT

## 2018-12-28 PROCEDURE — 99999 PR PBB SHADOW E&M-EST. PATIENT-LVL III: CPT | Mod: PBBFAC,,,

## 2018-12-28 PROCEDURE — 85025 COMPLETE CBC W/AUTO DIFF WBC: CPT

## 2018-12-28 PROCEDURE — 99499 UNLISTED E&M SERVICE: CPT | Mod: S$GLB,,, | Performed by: SURGERY

## 2018-12-28 NOTE — PROGRESS NOTES
Transplant Surgery Kidney Donor Follow-up    Chief Complaint: Bridgett Chua is a 53 y.o. year old female who  Underwent left robotic/laparoscopic donor nephrectomy on .  She presents for surgical follow-up.  Current symptoms include none.     Review of Systems   Constitutional: Negative for fatigue.   HENT: Negative for drooling, postnasal drip and sore throat.    Eyes: Negative for discharge and itching.   Respiratory: Negative for choking and stridor.    Gastrointestinal: Negative for rectal pain.   Endocrine: Negative for polydipsia.   Genitourinary: Negative for enuresis and genital sores.   Musculoskeletal: Negative for back pain, neck pain and neck stiffness.   Allergic/Immunologic: Negative for immunocompromised state.   Neurological: Negative for facial asymmetry and numbness.   Hematological: Negative for adenopathy.   Psychiatric/Behavioral: Negative for behavioral problems, self-injury and suicidal ideas.     Physical Exam   Abdominal:       Incisions healed well     Lab Results   Component Value Date    BUN 16 12/28/2018    CREATININE 1.4 12/28/2018     Lab Results   Component Value Date    WBC 8.69 12/28/2018    HGB 12.8 12/28/2018    HCT 39.5 12/28/2018     12/28/2018       Assessment and Plan:  1. S/p nephrectomy        Bridgett is doing well following living kidney donation.  She  is advised to refrain from heavy lifting for a period of two to four weeks from the date of surgery and then gradually resume normal activities.   I discussed the need for our program to be able to contact living donors for UNOS reporting purposes for a minimum of 2 years.  Failure of our center to be able to provide such information could jeopardize our ability to continue to offer living donor transplants.  Bridgett voices understanding and agrees to this follow-up.

## 2019-05-13 ENCOUNTER — TELEPHONE (OUTPATIENT)
Dept: TRANSPLANT | Facility: CLINIC | Age: 54
End: 2019-05-13

## 2019-05-13 DIAGNOSIS — Z00.5 TRANSPLANT DONOR EVALUATION: Primary | ICD-10-CM

## 2019-05-13 NOTE — TELEPHONE ENCOUNTER
Bridgett Chua    5/13/2019    [id number]    Description: female 1965    Attempt at contact: 1st    Follow-up Period:6 month    Physical Capacity: No limitations    Working for income: Yes. Working full-time    Loss of medical insurance due to donation: No    Current weight: NA lbs.     Date of weight measurement: NA    Any ER visits since last contact: No    Any hospitalizations since last contact: No    Any medical issues since last contact: No    Dialysis required: No    Diabetes: No    Any kidney complications: No    Notes: Pt reports doing well without any complications.  Labs scheduled for 5/20/19.

## 2019-05-20 ENCOUNTER — LAB VISIT (OUTPATIENT)
Dept: LAB | Facility: HOSPITAL | Age: 54
End: 2019-05-20
Attending: INTERNAL MEDICINE
Payer: MEDICARE

## 2019-05-20 DIAGNOSIS — Z00.5 TRANSPLANT DONOR EVALUATION: ICD-10-CM

## 2019-05-20 LAB
ALBUMIN SERPL BCP-MCNC: 3.9 G/DL (ref 3.5–5.2)
ANION GAP SERPL CALC-SCNC: 8 MMOL/L (ref 8–16)
BUN SERPL-MCNC: 9 MG/DL (ref 6–20)
CALCIUM SERPL-MCNC: 10 MG/DL (ref 8.7–10.5)
CHLORIDE SERPL-SCNC: 106 MMOL/L (ref 95–110)
CO2 SERPL-SCNC: 29 MMOL/L (ref 23–29)
CREAT SERPL-MCNC: 1.3 MG/DL (ref 0.5–1.4)
EST. GFR  (AFRICAN AMERICAN): 54.5 ML/MIN/1.73 M^2
EST. GFR  (NON AFRICAN AMERICAN): 47.3 ML/MIN/1.73 M^2
GLUCOSE SERPL-MCNC: 78 MG/DL (ref 70–110)
PHOSPHATE SERPL-MCNC: 3.4 MG/DL (ref 2.7–4.5)
POTASSIUM SERPL-SCNC: 3.7 MMOL/L (ref 3.5–5.1)
SODIUM SERPL-SCNC: 143 MMOL/L (ref 136–145)

## 2019-05-20 PROCEDURE — 36415 COLL VENOUS BLD VENIPUNCTURE: CPT

## 2019-05-20 PROCEDURE — 80069 RENAL FUNCTION PANEL: CPT

## 2019-09-04 NOTE — LETTER
September 4, 2019    Bridgett Chua  [address] [facility]  [address]  [place] [address]       Dear Bridgett Chua:    Thank you again for your generous decision to donate your kidney. This is truly an amazing gift that you have given.    In order to ensure your health after donation and to meet the requirements for all donors with the governmental agency, UNOS ([employer]), we are required to report on your progress and health at specific intervals.     It is very important after donation, that you establish with a Primary Care Physician to maintain your health.  These visits will be paid under your own health insurance since your recipients insurance will not cover this routine care.    It is now time for your 1 year follow up.    ? Please schedule an appointment with your physician for a checkup.     ? In order to watch your kidney function, the following tests are required to be checked between the dates of  09/19/2019 -01/19/2020:    o Blood creatinine  o Urinalysis     ? Please have your physician fax your test results and office visit note to [phone].    ? If you need assistance scheduling lab or do not have health insurance, please contact us for appointments.    Please feel free to call if you have any questions regarding this request for information. We greatly appreciate your assistance and sincerely hope your donation experience was a pleasant one.     Sincerely,    Norah Chowdhury RN, BSN, [facility]  Senior Transplant Coordinator  [facility]  Phone [phone]  Fax  [phone]  [email or web address]

## 2019-11-12 ENCOUNTER — TELEPHONE (OUTPATIENT)
Dept: TRANSPLANT | Facility: CLINIC | Age: 54
End: 2019-11-12

## 2019-11-12 NOTE — TELEPHONE ENCOUNTER
Bridgett Chua    11/12/2019    [id number]    Description: female 1965    Attempt at contact: 1st    Follow-up Period:1 year    Physical Capacity: No limitations    Working for income: Yes. Working full-time    Loss of medical insurance due to donation:no    Current weight: 165 lbs.    Date of weight measurement: 10/3/2019    Any ER visits since last contact: no    Any hospitalizations since last contact: No    Any medical issues since last contact: No    Dialysis required: No    Diabetes: No    Any kidney complications: No    Notes: pt states she is doing very well. No complaints. Will schedule an appt w/ her PCP for labs and yearly follow up.

## 2019-12-03 ENCOUNTER — TELEPHONE (OUTPATIENT)
Dept: TRANSPLANT | Facility: CLINIC | Age: 54
End: 2019-12-03

## 2020-01-07 ENCOUNTER — TELEPHONE (OUTPATIENT)
Dept: TRANSPLANT | Facility: CLINIC | Age: 55
End: 2020-01-07

## 2021-01-12 ENCOUNTER — TELEPHONE (OUTPATIENT)
Dept: TRANSPLANT | Facility: CLINIC | Age: 56
End: 2021-01-12

## 2021-03-10 NOTE — TELEPHONE ENCOUNTER
----- Message from Manju Graham sent at 10/22/2018 12:09 PM CDT -----  Regarding: Need Pre-Op Appt.  Please scheduled this patient on Keyonna Becker's schedule.  Date: 11/06/2018 at 01:00 PM.  Kidney Donor, Surg Date: 11/19/18-[facility].  Let me know if you have any questions regarding this request.    Thanks:  Manju     declines

## 2021-06-09 NOTE — PROGRESS NOTES
Discharge Note     SW confirmed discharge.  Pt scheduled to discharge today to pt's home under the care of cousin with no needs at this time. EUSEBIA reviewed discharge plan with pt. Pt aware of and involved in discharge plan. Venita to transport pt. No needs reported. Pt verbalized understanding and agreement with information reviewed, social work availability, and how to assess available resources as needed. SW remains available.           [place]

## 2022-02-22 NOTE — PROGRESS NOTES
Kidney Donor Preoperative Evaluation    Subjective:     CC:  Preoperative evaluation before donor nephrectomy.    HPI:  Ms. Chua is a 53 y.o. year old Black or  female who has been approved to be a living unrelated donor for her brother in law.  She denies any changes in her health condition since her previous visit.      Patient denies any history of coronary artery disease, stroke, seizure disorder, chronic obstructive pulmonary disease, liver disease, kidney stones, gallstones, deep venous thrombosis, pulmonary embolism, recurrent urinary tract infections or malignancies.    Home readings:  7/18/18: 115/81  7/19/18: 116/78  7/20/18: 102/71  7/21/18: 111/76  7/23/18: 124/78    Colonoscopy 6/14/2018 with multiple polyps, repeat in 3 years 6/2021  Discussed avoiding NSAIDs use post nephrectomy and  routine f/u With PCP. All questions answered.   Denies any new infections  or medication use since last visit. Overall feels well.         Social History     Tobacco Use    Smoking status: Never Smoker    Smokeless tobacco: Never Used   Substance Use Topics    Alcohol use: No     Comment: rarely will drink a glass of wine    Drug use: No     Past Surgical History:   Procedure Laterality Date    DILATION AND CURETTAGE OF UTERUS  2007     Family History   Problem Relation Age of Onset    Diabetes Mother         diagnosed in her 50-60s    Hypertension Mother         diagnosed in her 50-60s    Breast cancer Mother         diagnosed in her 50s    COPD Mother     Diabetes Father         diagnosed in his 60s    Hypertension Father         diagnosed in his 60s    Prostate cancer Father         diagnosed in his late 40s    Heart attack Father         MI in his mid-late 60s    Lupus Sister         diagnosed in her early 40s    No Known Problems Brother     Breast cancer Maternal Grandmother     No Known Problems Sister     No Known Problems Brother     No Known Problems Brother     Cervical  "cancer Paternal Grandmother         diagnosed in her 80s    Colon cancer Maternal Aunt     Prostate cancer Paternal Uncle     Heart disease Paternal Uncle     Prostate cancer Paternal Uncle     Prostate cancer Paternal Uncle     Kidney disease Neg Hx     Stroke Neg Hx      Past Medical History:   Diagnosis Date    ADD (attention deficit disorder)     Hyperlipidemia     Willing to be kidney donor        Review of Systems   Constitutional: Negative for activity change, appetite change, chills, fatigue, fever and unexpected weight change.   HENT: Negative for congestion, facial swelling, postnasal drip, rhinorrhea, sinus pressure, sore throat and trouble swallowing.    Eyes: Negative for pain, redness and visual disturbance.   Respiratory: Negative for cough, chest tightness, shortness of breath and wheezing.    Cardiovascular: Negative.  Negative for chest pain, palpitations and leg swelling.   Gastrointestinal: Negative for abdominal pain, diarrhea, nausea and vomiting.   Genitourinary: Negative for dysuria, flank pain and urgency.   Musculoskeletal: Negative for gait problem, neck pain and neck stiffness.   Skin: Negative for rash.   Allergic/Immunologic: Negative for environmental allergies, food allergies and immunocompromised state.   Neurological: Negative for dizziness, weakness, light-headedness and headaches.   Psychiatric/Behavioral: Negative for agitation and confusion. The patient is not nervous/anxious.        Objective:   body mass index is 31.27 kg/m².   /86 (BP Location: Right arm, Patient Position: Sitting, BP Method: Medium (Automatic))   Pulse 98   Temp 98.8 °F (37.1 °C) (Oral)   Resp 18   Ht 5' 1.42" (1.56 m)   Wt 76.1 kg (167 lb 12.3 oz)   SpO2 96%   BMI 31.27 kg/m²     Physical Exam   Constitutional: She is oriented to person, place, and time. She appears well-developed and well-nourished.   HENT:   Head: Normocephalic.   Mouth/Throat: Oropharynx is clear and moist. No " oropharyngeal exudate.   Eyes: Conjunctivae and EOM are normal. Pupils are equal, round, and reactive to light. No scleral icterus.   Neck: Normal range of motion. Neck supple.   Cardiovascular: Normal rate, regular rhythm and normal heart sounds.   Pulmonary/Chest: Effort normal and breath sounds normal.   Abdominal: Soft. Normal appearance and bowel sounds are normal. She exhibits no distension and no mass. There is no splenomegaly or hepatomegaly. There is no tenderness. There is no rebound, no guarding, no CVA tenderness, no tenderness at McBurney's point and negative Haji's sign.   Musculoskeletal: Normal range of motion. She exhibits no edema.   Lymphadenopathy:     She has no cervical adenopathy.   Neurological: She is alert and oriented to person, place, and time. She exhibits normal muscle tone. Coordination normal.   Skin: Skin is warm and dry.   Psychiatric: She has a normal mood and affect. Her behavior is normal.   Vitals reviewed.      Labs:     11/6/2018: Creatinine 0.8 mg/dL (Ref range: 0.5 - 1.4 mg/dL); BUN, Bld 6 mg/dL (Ref range: 6 - 20 mg/dL)   Labs were reviewed with the patient.  ABO type: O POS  Lab Results   Component Value Date    WBC 8.65 11/06/2018    HGB 13.6 11/06/2018    HCT 41.4 11/06/2018    MCV 92 11/06/2018     11/06/2018     Lab Results   Component Value Date    CREATININE 0.8 11/06/2018    BUN 6 11/06/2018     11/06/2018    K 4.1 11/06/2018     11/06/2018    CO2 26 11/06/2018     Lab Results   Component Value Date    ALT 16 11/06/2018    AST 18 11/06/2018    ALKPHOS 83 11/06/2018    BILITOT 0.9 11/06/2018       Assessment:     1. Willing to be kidney donor    2. Hyperlipidemia, unspecified hyperlipidemia type    3. Attention deficit hyperactivity disorder (ADHD), unspecified ADHD type      Lab Results   Component Value Date    HGBA1C 5.5 07/16/2018         Plan:   Body mass index is 31.27 kg/m².  BP WNL       Donor Candidacy:   Ms. Chua remains a suitable  candidate for kidney donation.    Lesley Dan NP       Counseling:   I discussed with Ms. Chua that donation is a voluntary activity and reiterated it should be done willingly and for altruistic reasons only.  I reviewed that no payment should be received for donating.  I also discussed that coercion, guilt, pressure, or feelings of obligation are not appropriate reasons to donate.  The option to withdraw at any time was emphasized.  Ms. Chua was reminded that a medical opt out can be given to protect her confidentiality, and no member of the transplant team will discuss specifics of her health or medical/social history with anyone else without permission.  The need for lifelong routine medical follow-up for optimal health, including routine health maintenance was reviewed.    We also discussed the long term risks associated with kidney donation.  I told the patient that her GFR should return to within 75-80% of pre-donation level within six months of donation.  I informed the patient that there is a small risk of developing albuminuria and hypertension following donor nephrectomy.  I also informed the patient that based on current literature, the risk of developing end-stage renal disease following donor nephrectomy is similar to the general population.    I rereviewed with Ms. Chua available lab results and other diagnostics from the evaluation process    Additional Counseling:   The patient was counseled on the need for regular follow-up with a primary care physician for blood pressure and cholesterol screening.  The importance of age appropriate health screening was also emphasized.    Follow-up:  Follow-up with transplant surgery per protocol.   Ketoconazole Counseling:   Patient counseled regarding improving absorption with orange juice.  Adverse effects include but are not limited to breast enlargement, headache, diarrhea, nausea, upset stomach, liver function test abnormalities, taste disturbance, and stomach pain.  There is a rare possibility of liver failure that can occur when taking ketoconazole. The patient understands that monitoring of LFTs may be required, especially at baseline. The patient verbalized understanding of the proper use and possible adverse effects of ketoconazole.  All of the patient's questions and concerns were addressed.

## 2025-04-11 NOTE — LETTER
August 22, 2018      Vivian Olvera MD  [address]  [facility] [address]           [facility]  [address]  [facility] [address]  Phone: [phone]  Fax: [phone]          Patient: Bridgett Chua   MR Number: [id number]   YOB: 1965   Date of Visit: 8/21/2018       Dear Dr. Vivian Olvera:    Thank you for referring Bridgett Chua to me for evaluation. Attached you will find relevant portions of my assessment and plan of care.    If you have questions, please do not hesitate to call me. I look forward to following Bridgett Chua along with you.    Sincerely,    Estee Mota MD    Enclosure  CC:  No Recipients    If you would like to receive this communication electronically, please contact [email or web address] or [phone] to request more information on [employer] access.    For providers and/or their staff who would like to refer a patient to [facility], please contact us through our one-stop-shop provider referral line, [facility], at [phone].    If you feel you have received this communication in error or would no longer like to receive these types of communications, please e-mail [email or web address]          Writer contacted pt to discuss scheduling surgery. No answer, left voicemail with call back information

## (undated) DEVICE — SUT MCRYL PLUS 4-0 PS2 27IN

## (undated) DEVICE — SYR 30CC LUER LOCK

## (undated) DEVICE — NDL SAFETY 25G X 1.5 ECLIPSE

## (undated) DEVICE — COVER LIGHT HANDLE

## (undated) DEVICE — DRAPE ABDOMINAL TIBURON 14X11

## (undated) DEVICE — KIT ANTIFOG

## (undated) DEVICE — TROCAR ENDOPATH XCEL 12X100MM

## (undated) DEVICE — SET DECANTER MEDICHOICE

## (undated) DEVICE — DRAPE SLUSH WARMER WITH DISC

## (undated) DEVICE — TROCAR SPACEMAKER BLUNT 12MM

## (undated) DEVICE — COVER LIGHT HANDLE 80/CA

## (undated) DEVICE — SUT PROLENE 6-0 BV-1 30IN

## (undated) DEVICE — TRAY MINOR GEN SURG

## (undated) DEVICE — TRAY FOLEY 16FR INFECTION CONT

## (undated) DEVICE — CART STAPLE RELD 45MM WHT

## (undated) DEVICE — PACK SET UP CONVERTORS

## (undated) DEVICE — STAPLER SKIN ROTATING HEAD

## (undated) DEVICE — IRRIGATOR ENDOSCOPY DISP.

## (undated) DEVICE — SUT 1 36IN PDS II VIO MONO

## (undated) DEVICE — APPLICATOR CHLORAPREP ORN 26ML

## (undated) DEVICE — TUBE SET SINGLE LUMEN FILTERED

## (undated) DEVICE — SPONGE LAP 18X18 PREWASHED

## (undated) DEVICE — GOWN SURGICAL X-LARGE

## (undated) DEVICE — ADHESIVE DERMABOND ADVANCED

## (undated) DEVICE — DRAPE INCISE IOBAN 2 23X17IN

## (undated) DEVICE — SUT 2-0 12-18IN SILK

## (undated) DEVICE — SOL ELECTROLUBE ANTI-STIC

## (undated) DEVICE — HOLDER TUBE

## (undated) DEVICE — SOL NS 1000CC

## (undated) DEVICE — SEE MEDLINE ITEM 152622

## (undated) DEVICE — DRAPE BAG ISOLATION 20 X 20

## (undated) DEVICE — KIT GELPORT LAPAROSCOPIC ABD

## (undated) DEVICE — SUT 3-0 12-18IN SILK

## (undated) DEVICE — DRAPE SCOPE PILLOW WARMER

## (undated) DEVICE — CLIP SPRING 6MM

## (undated) DEVICE — SUT 4-0 12-18IN SILK BLACK

## (undated) DEVICE — SET IRR URLGY 2LINE UNIV SPIKE

## (undated) DEVICE — KIT ROBOTIC 3 ARM DA VINCI SI

## (undated) DEVICE — SUT EASE CROSSBOW CLSR SYS

## (undated) DEVICE — STAPLER INT LINEAR ARTC 3.5-45

## (undated) DEVICE — APPLIER CLIP ENDO MED/LG 10MM

## (undated) DEVICE — HEMOSTAT SURGICEL NU-KNIT 6X9

## (undated) DEVICE — SYR 10CC LUER LOCK

## (undated) DEVICE — ELECTRODE REM PLYHSV RETURN 9

## (undated) DEVICE — CORD BIPOLAR 12 FOOT